# Patient Record
Sex: FEMALE | Race: BLACK OR AFRICAN AMERICAN | NOT HISPANIC OR LATINO | Employment: FULL TIME | URBAN - METROPOLITAN AREA
[De-identification: names, ages, dates, MRNs, and addresses within clinical notes are randomized per-mention and may not be internally consistent; named-entity substitution may affect disease eponyms.]

---

## 2017-03-10 DIAGNOSIS — F41.8 ANXIETY ASSOCIATED WITH DEPRESSION: ICD-10-CM

## 2017-03-10 DIAGNOSIS — K58.9 IRRITABLE BOWEL SYNDROME WITHOUT DIARRHEA: ICD-10-CM

## 2017-03-10 DIAGNOSIS — K21.9 GASTROESOPHAGEAL REFLUX DISEASE, ESOPHAGITIS PRESENCE NOT SPECIFIED: ICD-10-CM

## 2017-03-10 DIAGNOSIS — K52.9 GASTROENTERITIS: ICD-10-CM

## 2017-03-10 DIAGNOSIS — R51.9 HEADACHE DISORDER: ICD-10-CM

## 2017-03-10 RX ORDER — DIAZEPAM 10 MG/1
10 TABLET ORAL 2 TIMES DAILY PRN
Qty: 60 TABLET | Refills: 1 | OUTPATIENT
Start: 2017-03-10 | End: 2017-04-09

## 2017-03-10 RX ORDER — TOPIRAMATE 50 MG/1
50 TABLET, FILM COATED ORAL EVERY 12 HOURS
OUTPATIENT
Start: 2017-03-10

## 2017-03-10 RX ORDER — ELETRIPTAN HYDROBROMIDE 40 MG/1
40 TABLET, FILM COATED ORAL
Qty: 30 TABLET | Refills: 2 | OUTPATIENT
Start: 2017-03-10

## 2017-03-10 RX ORDER — ESOMEPRAZOLE MAGNESIUM 40 MG/1
40 CAPSULE, DELAYED RELEASE ORAL 2 TIMES DAILY
Qty: 60 CAPSULE | Refills: 3 | OUTPATIENT
Start: 2017-03-10

## 2017-03-10 RX ORDER — DICYCLOMINE HYDROCHLORIDE 20 MG/1
20 TABLET ORAL DAILY
Qty: 120 TABLET | Refills: 3 | OUTPATIENT
Start: 2017-03-10

## 2017-03-10 RX ORDER — PROMETHAZINE HYDROCHLORIDE 25 MG/1
25 TABLET ORAL EVERY 4 HOURS
Qty: 15 TABLET | Refills: 0 | OUTPATIENT
Start: 2017-03-10

## 2017-03-10 NOTE — TELEPHONE ENCOUNTER
----- Message from Carri Fall sent at 3/10/2017  1:04 PM CST -----  Contact: [phone]  [place] is leaving [place] of [place] country on [place] and she needs refills on all her medication pt is going to be gone for 6 mos Please call pt at your earliest convenience.  Thanks !

## 2017-03-11 ENCOUNTER — OFFICE VISIT (OUTPATIENT)
Dept: FAMILY MEDICINE | Facility: CLINIC | Age: 42
End: 2017-03-11
Payer: COMMERCIAL

## 2017-03-11 VITALS
SYSTOLIC BLOOD PRESSURE: 135 MMHG | DIASTOLIC BLOOD PRESSURE: 83 MMHG | TEMPERATURE: 98 F | RESPIRATION RATE: 17 BRPM | WEIGHT: 251.31 LBS | OXYGEN SATURATION: 97 % | HEIGHT: 67 IN | BODY MASS INDEX: 39.44 KG/M2 | HEART RATE: 82 BPM

## 2017-03-11 DIAGNOSIS — K58.9 IRRITABLE BOWEL SYNDROME WITHOUT DIARRHEA: ICD-10-CM

## 2017-03-11 DIAGNOSIS — K52.9 GASTROENTERITIS: ICD-10-CM

## 2017-03-11 DIAGNOSIS — K21.9 GASTROESOPHAGEAL REFLUX DISEASE, ESOPHAGITIS PRESENCE NOT SPECIFIED: ICD-10-CM

## 2017-03-11 PROCEDURE — 1160F RVW MEDS BY RX/DR IN RCRD: CPT | Mod: S$GLB,,, | Performed by: NURSE PRACTITIONER

## 2017-03-11 PROCEDURE — 99999 PR PBB SHADOW E&M-EST. PATIENT-LVL III: CPT | Mod: PBBFAC,,,

## 2017-03-11 PROCEDURE — 99212 OFFICE O/P EST SF 10 MIN: CPT | Mod: S$GLB,,, | Performed by: NURSE PRACTITIONER

## 2017-03-11 RX ORDER — PROMETHAZINE HYDROCHLORIDE 25 MG/1
25 TABLET ORAL EVERY 4 HOURS
Qty: 15 TABLET | Refills: 0 | Status: SHIPPED | OUTPATIENT
Start: 2017-03-11 | End: 2018-08-01

## 2017-03-11 RX ORDER — ESOMEPRAZOLE MAGNESIUM 40 MG/1
40 CAPSULE, DELAYED RELEASE ORAL 2 TIMES DAILY
Qty: 60 CAPSULE | Refills: 0 | Status: SHIPPED | OUTPATIENT
Start: 2017-03-11 | End: 2018-04-26 | Stop reason: SDUPTHER

## 2017-03-11 RX ORDER — TOPIRAMATE 50 MG/1
50 TABLET, FILM COATED ORAL EVERY 12 HOURS
Status: CANCELLED | OUTPATIENT
Start: 2017-03-11

## 2017-03-11 RX ORDER — DICYCLOMINE HYDROCHLORIDE 20 MG/1
20 TABLET ORAL DAILY
Qty: 120 TABLET | Refills: 0 | Status: SHIPPED | OUTPATIENT
Start: 2017-03-11 | End: 2018-09-17

## 2017-03-11 RX ORDER — DICYCLOMINE HYDROCHLORIDE 20 MG/1
20 TABLET ORAL DAILY
Qty: 120 TABLET | Refills: 3 | Status: CANCELLED | OUTPATIENT
Start: 2017-03-11

## 2017-03-11 RX ORDER — ESOMEPRAZOLE MAGNESIUM 40 MG/1
40 CAPSULE, DELAYED RELEASE ORAL 2 TIMES DAILY
Qty: 60 CAPSULE | Refills: 3 | Status: CANCELLED | OUTPATIENT
Start: 2017-03-11

## 2017-03-11 RX ORDER — TOPIRAMATE 50 MG/1
50 TABLET, FILM COATED ORAL EVERY 12 HOURS
Qty: 60 TABLET | Refills: 0 | Status: ON HOLD | OUTPATIENT
Start: 2017-03-11 | End: 2018-07-11 | Stop reason: HOSPADM

## 2017-03-11 NOTE — PROGRESS NOTES
Subjective:       Patient ID: Miley Smith is a 41 y.o. female who presents to urgent care for medication refill, says she called on Friday for Dr. 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       Chief Complaint: Medication Refill    HPI  Review of Systems   Constitutional: Negative for activity change, appetite change, chills and fatigue.   Respiratory: Negative for apnea, cough, choking, chest tightness, shortness of breath and stridor.    Cardiovascular: Negative for chest pain and leg swelling.   Gastrointestinal: Negative for abdominal distention, abdominal pain, anal bleeding, blood in stool, constipation, diarrhea, nausea, rectal pain and vomiting.   Genitourinary: Negative for dyspareunia, dysuria and enuresis.   Neurological: Positive for headaches. Negative for dizziness, facial asymmetry and light-headedness.   Psychiatric/Behavioral: Negative for agitation, behavioral problems, confusion and decreased concentration.       Objective:      Physical Exam   Constitutional: She is oriented to person, place, and time. She appears well-developed and well-nourished. No distress.   Neck: Normal range of motion. Neck supple.   Cardiovascular: Normal rate, regular rhythm and normal heart sounds.    Pulmonary/Chest: Effort normal and breath sounds normal. No respiratory distress.   Abdominal: Soft. Bowel sounds are normal. She exhibits no distension. There is no tenderness.   Musculoskeletal: Normal range of motion. She exhibits no tenderness.   Lymphadenopathy:     She has no cervical adenopathy.   Neurological: She is alert and oriented to person, place, and time.   Skin: Skin is warm and dry. No rash noted.   Psychiatric: She has a normal mood and affect. Her behavior is normal. Judgment and thought content normal.   Nursing note and vitals  reviewed.      Assessment:       1. Gastroesophageal reflux disease, esophagitis presence not specified    2. Irritable bowel syndrome without diarrhea    3. Gastroenteritis        Plan:       Explained that urgent care could only provide one month supply, says she really needs the Valium refilled but explained to her that Urgent care can not refill sedatives    Miley was seen today for medication refill.    Diagnoses and all orders for this visit:    Gastroesophageal reflux disease, esophagitis presence not specified  -     esomeprazole (NEXIUM) 40 MG capsule; Take 1 capsule (40 mg total) by mouth 2 (two) times daily.    Irritable bowel syndrome without diarrhea  -     dicyclomine (BENTYL) 20 mg tablet; Take 1 tablet (20 mg total) by mouth once daily.    Gastroenteritis  -     promethazine (PHENERGAN) 25 MG tablet; Take 1 tablet (25 mg total) by mouth every 4 (four) hours.    Other orders  -     Cancel: topiramate (TOPAMAX) 50 MG tablet; Take 1 tablet (50 mg total) by mouth every 12 (twelve) hours.  -     Cancel: esomeprazole (NEXIUM) 40 MG capsule; Take 1 capsule (40 mg total) by mouth 2 (two) times daily.  -     Cancel: dicyclomine (BENTYL) 20 mg tablet; Take 1 tablet (20 mg total) by mouth once daily.  -     topiramate (TOPAMAX) 50 MG tablet; Take 1 tablet (50 mg total) by mouth every 12 (twelve) hours. Every 12 hours        Education  Pt has been given instructions populated from [employer] database and those entered into patient instructions field and has verbalized understanding of the after visit summary and information contained wherein.    Follow Up  If no better 2-3 days fu with pcp    In Case of Emergency   May go to ER for acute shortness of breath, lightheadedness, fever, or any other emergent complaints or changes in condition.

## 2018-04-26 ENCOUNTER — OFFICE VISIT (OUTPATIENT)
Dept: URGENT CARE | Facility: CLINIC | Age: 43
End: 2018-04-26
Payer: COMMERCIAL

## 2018-04-26 VITALS
BODY MASS INDEX: 41.28 KG/M2 | TEMPERATURE: 98 F | HEIGHT: 67 IN | SYSTOLIC BLOOD PRESSURE: 132 MMHG | DIASTOLIC BLOOD PRESSURE: 74 MMHG | OXYGEN SATURATION: 98 % | RESPIRATION RATE: 12 BRPM | HEART RATE: 82 BPM | WEIGHT: 263 LBS

## 2018-04-26 DIAGNOSIS — M94.0 COSTOCHONDRITIS, ACUTE: ICD-10-CM

## 2018-04-26 DIAGNOSIS — J20.9 ACUTE PURULENT BRONCHITIS: Primary | ICD-10-CM

## 2018-04-26 DIAGNOSIS — K21.9 GASTROESOPHAGEAL REFLUX DISEASE, ESOPHAGITIS PRESENCE NOT SPECIFIED: ICD-10-CM

## 2018-04-26 PROCEDURE — 99214 OFFICE O/P EST MOD 30 MIN: CPT | Mod: S$GLB,,, | Performed by: NURSE PRACTITIONER

## 2018-04-26 RX ORDER — CODEINE PHOSPHATE AND GUAIFENESIN 10; 100 MG/5ML; MG/5ML
5 SOLUTION ORAL NIGHTLY PRN
Qty: 118 ML | Refills: 0 | Status: SHIPPED | OUTPATIENT
Start: 2018-04-26 | End: 2018-05-06

## 2018-04-26 RX ORDER — ESOMEPRAZOLE MAGNESIUM 40 MG/1
CAPSULE, DELAYED RELEASE ORAL
Qty: 180 CAPSULE | Refills: 0 | Status: SHIPPED | OUTPATIENT
Start: 2018-04-26 | End: 2019-04-01 | Stop reason: SDUPTHER

## 2018-04-26 RX ORDER — BENZONATATE 100 MG/1
200 CAPSULE ORAL 3 TIMES DAILY PRN
Qty: 30 CAPSULE | Refills: 0 | Status: SHIPPED | OUTPATIENT
Start: 2018-04-26 | End: 2018-05-06

## 2018-04-26 RX ORDER — ESOMEPRAZOLE MAGNESIUM 40 MG/1
40 CAPSULE, DELAYED RELEASE ORAL 2 TIMES DAILY
Qty: 60 CAPSULE | Refills: 0 | Status: SHIPPED | OUTPATIENT
Start: 2018-04-26 | End: 2018-04-26 | Stop reason: SDUPTHER

## 2018-04-26 RX ORDER — AZITHROMYCIN 250 MG/1
TABLET, FILM COATED ORAL
Qty: 2 TABLET | Refills: 0 | Status: SHIPPED | OUTPATIENT
Start: 2018-04-26 | End: 2018-05-02 | Stop reason: ALTCHOICE

## 2018-04-26 NOTE — PATIENT INSTRUCTIONS
Please follow up with your primary care provider if you are not feeling better in 7-10 days.    Please drink plenty of fluids.  Please get plenty of rest.    Please return here or go to the Emergency Department for any concerns or worsening of condition.    If you were prescribed antibiotics, please take them to completion.    If you were prescribed a narcotic medication (cough syrup), do not drive or operate heavy equipment or machinery while taking these medications.    If you do not have Hypertension or any history of palpitations, it is ok to take over the counter Sudafed or Mucinex D or Allegra-D or Claritin-D or Zyrtec-D.  If you do take one of the above, it is ok to combine that with plain over the counter Mucinex or Allegra or Claritin or Zyrtec.  If for example you are taking Zyrtec -D, you can combine that with Mucinex, but not Mucinex-D.  If you are taking Mucinex-D, you can combine that with plain Allegra or Claritin or Zyrtec.     If you do have Hypertension or palpitations, it is safe to take Coricidin HBP or Mucinex DM for relief of congestion and cough. Take as directed on bottle with at least 2 glasses of water.    If not allergic, please take over the counter Tylenol (Acetaminophen) and/or Motrin (Ibuprofen) as directed on bottle for control of pain and/or fever.    Please follow up with your primary care doctor or specialist as needed.    If you  smoke, please stop smoking.    Bronchitis, Antibiotic Treatment (Adult)    Bronchitis is an infection of the air passages (bronchial tubes) in your lungs. It often occurs when you have a cold. This illness is contagious during the first few days and is spread through the air by coughing and sneezing, or by direct contact (touching the sick person and then touching your own eyes, nose, or mouth).  Symptoms of bronchitis include cough with mucus (phlegm) and low-grade fever. Bronchitis usually lasts 7 to 14 days. Mild cases can be treated with simple home  remedies. More severe infection is treated with an antibiotic.  Home care  Follow these guidelines when caring for yourself at home:  · If your symptoms are severe, rest at home for the first 2 to 3 days. When you go back to your usual activities, don't let yourself get too tired.  · Do not smoke. Also avoid being exposed to secondhand smoke.  · You may use over-the-counter medicines to control fever or pain, unless another medicine was prescribed. (Note: If you have chronic liver or kidney disease or have ever had a stomach ulcer or gastrointestinal bleeding, talk with your healthcare provider before using these medicines. Also talk to your provider if you are taking medicine to prevent blood clots.) Aspirin should never be given to anyone younger than 18 years of age who is ill with a viral infection or fever. It may cause severe liver or brain damage.  · Your appetite may be poor, so a light diet is fine. Avoid dehydration by drinking 6 to 8 glasses of fluids per day (such as water, soft drinks, sports drinks, juices, tea, or soup). Extra fluids will help loosen secretions in the nose and lungs.  · Over-the-counter cough, cold, and sore-throat medicines will not shorten the length of the illness, but they may be helpful to reduce symptoms. (Note: Do not use decongestants if you have high blood pressure.)  · Finish all antibiotic medicine. Do this even if you are feeling better after only a few days.  Follow-up care  Follow up with your healthcare provider, or as advised. If you had an X-ray or ECG (electrocardiogram), a specialist will review it. You will be notified of any new findings that may affect your care.  Note: If you are age 65 or older, or if you have a chronic lung disease or condition that affects your immune system, or you smoke, talk to your healthcare provider about having pneumococcal vaccinations and a yearly influenza vaccination (flu shot).  When to seek medical advice  Call your healthcare  provider right away if any of these occur:  · Fever of 100.4°F (38°C) or higher  · Coughing up increased amounts of colored sputum  · Weakness, drowsiness, headache, facial pain, ear pain, or a stiff neck  Call 911, or get immediate medical care  Contact emergency services right away if any of these occur.  · Coughing up blood  · Worsening weakness, drowsiness, headache, or stiff neck  · Trouble breathing, wheezing, or pain with breathing  Date Last Reviewed: 9/13/2015 © 2000-2017 [employer]. [address] [place] [id number]. All rights reserved. This information is not intended as a substitute for professional medical care. Always follow your healthcare professional's instructions.

## 2018-04-26 NOTE — PROGRESS NOTES
"Subjective:       Patient ID: Miley Smith is a 42 y.o. female.    Vitals:  height is 5' 7" (1.702 m) and weight is 119.3 kg (263 lb). Her oral temperature is 97.9 °F (36.6 °C). Her blood pressure is 132/74 and her pulse is 82. Her respiration is 12 and oxygen saturation is 98%.     Chief Complaint: URI    Pt reports symptoms for greater than 1 week with lower front rib pain when taking deep breaths and twisting.      URI    This is a new problem. The current episode started 1 to 4 weeks ago. The problem has been gradually worsening. There has been no fever. Associated symptoms include chest pain (tightness), coughing, headaches, a sore throat and wheezing. Pertinent negatives include no abdominal pain, congestion, diarrhea, dysuria, ear pain, joint pain, joint swelling, nausea, neck pain, plugged ear sensation, rash, rhinorrhea, sinus pain, sneezing, swollen glands or vomiting. She has tried nothing for the symptoms.     Review of Systems   Constitution: Negative for chills, fever and malaise/fatigue.   HENT: Positive for hoarse voice and sore throat. Negative for congestion, ear pain, rhinorrhea, sinus pain and sneezing.    Eyes: Negative for discharge and redness.   Cardiovascular: Positive for chest pain (tightness). Negative for dyspnea on exertion and leg swelling.   Respiratory: Positive for cough, shortness of breath and wheezing. Negative for sputum production.    Skin: Negative for rash.   Musculoskeletal: Positive for back pain. Negative for joint pain, myalgias and neck pain.   Gastrointestinal: Negative for abdominal pain, diarrhea, nausea and vomiting.   Genitourinary: Negative for dysuria.   Neurological: Positive for headaches.   All other systems reviewed and are negative.      Objective:      Physical Exam   Constitutional: She is oriented to person, place, and time. Vital signs are normal. She appears well-developed and well-nourished. She is cooperative.  Non-toxic appearance. " She does not have a sickly appearance. She does not appear ill. No distress.   HENT:   Head: Normocephalic and atraumatic.   Right Ear: Hearing, tympanic membrane, external ear and ear canal normal.   Left Ear: Hearing, tympanic membrane, external ear and ear canal normal.   Nose: Mucosal edema and rhinorrhea present.   Mouth/Throat: Uvula is midline and mucous membranes are normal. Posterior oropharyngeal erythema present.   Eyes: Conjunctivae and lids are normal.   Neck: Normal range of motion and full passive range of motion without pain. Neck supple. No neck rigidity. No edema, no erythema and normal range of motion present.   Cardiovascular: Normal rate, regular rhythm and normal heart sounds.    Pulmonary/Chest: Effort normal and breath sounds normal. No accessory muscle usage. No apnea, no tachypnea and no bradypnea. No respiratory distress. She has no decreased breath sounds. She has no wheezes. She has no rhonchi. She has no rales.   Positive cough   Abdominal: Normal appearance.   Musculoskeletal:        Arms:  Pain in BL lower anterior ribs that is reproducible on palpation, deep breathing, and twisting of torso.   Lymphadenopathy:        Head (right side): No submental, no submandibular, no tonsillar, no preauricular, no posterior auricular and no occipital adenopathy present.        Head (left side): No submental, no submandibular, no tonsillar, no preauricular, no posterior auricular and no occipital adenopathy present.     She has no cervical adenopathy.   Neurological: She is alert and oriented to person, place, and time.   Skin: Skin is warm. Capillary refill takes less than 2 seconds.   Psychiatric: She has a normal mood and affect. Her speech is normal and behavior is normal.   Nursing note and vitals reviewed.      Assessment:       1. Acute purulent bronchitis    2. Gastroesophageal reflux disease, esophagitis presence not specified    3. Costochondritis, acute        Plan:         Acute  purulent bronchitis  -     azithromycin (ZITHROMAX) 250 MG tablet; Take 2 tablets (500 mg) on  Day 1,  followed by 1 tablet (250 mg) once daily on Days 2 through 5.  Dispense: 2 tablet; Refill: 0  -     benzonatate (TESSALON PERLES) 100 MG capsule; Take 2 capsules (200 mg total) by mouth 3 (three) times daily as needed.  Dispense: 30 capsule; Refill: 0  -     guaifenesin-codeine 100-10 mg/5 ml (TUSSI-ORGANIDIN NR)  mg/5 mL syrup; Take 5 mLs by mouth nightly as needed.  Dispense: 118 mL; Refill: 0    Gastroesophageal reflux disease, esophagitis presence not specified  -     esomeprazole (NEXIUM) 40 MG capsule; Take 1 capsule (40 mg total) by mouth 2 (two) times daily.  Dispense: 60 capsule; Refill: 0    Costochondritis, acute      NSAIDs and mucinex as directed on bottle. Increase fluid intake.

## 2018-05-02 ENCOUNTER — OFFICE VISIT (OUTPATIENT)
Dept: FAMILY MEDICINE | Facility: CLINIC | Age: 43
End: 2018-05-02
Payer: COMMERCIAL

## 2018-05-02 VITALS
WEIGHT: 268.31 LBS | HEIGHT: 67 IN | TEMPERATURE: 98 F | HEART RATE: 88 BPM | OXYGEN SATURATION: 97 % | SYSTOLIC BLOOD PRESSURE: 120 MMHG | DIASTOLIC BLOOD PRESSURE: 80 MMHG | BODY MASS INDEX: 42.11 KG/M2

## 2018-05-02 DIAGNOSIS — F41.8 ANXIETY ASSOCIATED WITH DEPRESSION: ICD-10-CM

## 2018-05-02 PROCEDURE — 99999 PR PBB SHADOW E&M-EST. PATIENT-LVL III: CPT | Mod: PBBFAC,,, | Performed by: FAMILY MEDICINE

## 2018-05-02 PROCEDURE — 99214 OFFICE O/P EST MOD 30 MIN: CPT | Mod: S$GLB,,, | Performed by: FAMILY MEDICINE

## 2018-05-02 RX ORDER — DIAZEPAM 10 MG/1
10 TABLET ORAL 2 TIMES DAILY PRN
Qty: 30 TABLET | Refills: 2 | Status: SHIPPED | OUTPATIENT
Start: 2018-05-02 | End: 2018-06-22 | Stop reason: SDUPTHER

## 2018-05-02 NOTE — PROGRESS NOTES
"Subjective:       Patient ID: Miley Smith is a 42 y.o. female.    Chief Complaint: Establish Care and Urgent Care Follow Up/ Bronchitis    Patient is here for refills of her valium. She states she takes this as needed for anxiety.    She was seen at urgent care for bronchitis. She was treated with a zpak and tessalon perles. She was given a cough syrup with codeine. It has improved. She denies fever or chills.       Review of Systems   Constitutional: Negative for chills, diaphoresis, fatigue and fever.   Respiratory: Positive for cough. Negative for shortness of breath and wheezing.    Cardiovascular: Negative for leg swelling.       Objective:       Vitals:    05/02/18 1127   BP: 120/80   Pulse: 88   Temp: 98 °F (36.7 °C)   TempSrc: Oral   SpO2: 97%   Weight: 121.7 kg (268 lb 4.8 oz)   Height: 5' 7" (1.702 m)   Body mass index is 42.02 kg/m².\      Physical Exam   Constitutional: She is oriented to person, place, and time. She appears well-developed and well-nourished. No distress.   HENT:   Head: Normocephalic and atraumatic.   Eyes: Conjunctivae are normal.   Neck: Normal range of motion. Neck supple. Carotid bruit is not present.   Cardiovascular: Normal rate, regular rhythm and normal heart sounds.  Exam reveals no gallop and no friction rub.    No murmur heard.  Pulmonary/Chest: Effort normal and breath sounds normal. No respiratory distress. She has no wheezes. She has no rales.   Musculoskeletal: She exhibits no edema.   Neurological: She is alert and oriented to person, place, and time.   Skin: She is not diaphoretic.       Assessment:       1. Anxiety associated with depression        Plan:       Miley was seen today for establish care and urgent care follow up/ bronchitis.    Diagnoses and all orders for this visit:    Anxiety associated with depression  -     diazePAM (VALIUM) 10 MG Tab; Take 1 tablet (10 mg total) by mouth 2 (two) times daily as needed.           "

## 2018-06-11 DIAGNOSIS — F41.8 ANXIETY ASSOCIATED WITH DEPRESSION: ICD-10-CM

## 2018-06-11 RX ORDER — DIAZEPAM 10 MG/1
10 TABLET ORAL 2 TIMES DAILY PRN
Qty: 30 TABLET | Refills: 2 | Status: CANCELLED | OUTPATIENT
Start: 2018-06-11 | End: 2018-07-11

## 2018-06-11 NOTE — TELEPHONE ENCOUNTER
----- Message from Yanely Wu sent at 6/11/2018  1:12 PM CDT -----  Contact: Self/ [phone]   Refill: [diazePAM (VALIUM) 10 MG Tab] Thank you.    [facility] [address] , [place] [address] [phone]

## 2018-06-11 NOTE — TELEPHONE ENCOUNTER
----- Message from Anne Hoffman sent at 6/11/2018  2:41 PM CDT -----  Contact: Self   Patient would like the doctor to see her medication to a different Pharmacy than in initial message. Please call patient at [phone].      [facility]

## 2018-06-15 ENCOUNTER — TELEPHONE (OUTPATIENT)
Dept: FAMILY MEDICINE | Facility: CLINIC | Age: 43
End: 2018-06-15

## 2018-06-15 NOTE — TELEPHONE ENCOUNTER
----- Message from Gisel Landers sent at 6/15/2018  3:57 PM CDT -----  Contact: Self   Pt is returning your call. Please call at [phone]. She says her script is telling her to take them twice a day so that's why she was taking them twice daily.

## 2018-06-22 ENCOUNTER — OFFICE VISIT (OUTPATIENT)
Dept: FAMILY MEDICINE | Facility: CLINIC | Age: 43
End: 2018-06-22
Payer: COMMERCIAL

## 2018-06-22 VITALS
TEMPERATURE: 98 F | HEIGHT: 67 IN | DIASTOLIC BLOOD PRESSURE: 80 MMHG | SYSTOLIC BLOOD PRESSURE: 100 MMHG | OXYGEN SATURATION: 97 % | HEART RATE: 84 BPM | WEIGHT: 272.25 LBS | BODY MASS INDEX: 42.73 KG/M2

## 2018-06-22 DIAGNOSIS — F41.8 ANXIETY ASSOCIATED WITH DEPRESSION: ICD-10-CM

## 2018-06-22 PROCEDURE — 99214 OFFICE O/P EST MOD 30 MIN: CPT | Mod: S$GLB,,, | Performed by: FAMILY MEDICINE

## 2018-06-22 PROCEDURE — 99999 PR PBB SHADOW E&M-EST. PATIENT-LVL III: CPT | Mod: PBBFAC,,, | Performed by: FAMILY MEDICINE

## 2018-06-22 PROCEDURE — 3008F BODY MASS INDEX DOCD: CPT | Mod: CPTII,S$GLB,, | Performed by: FAMILY MEDICINE

## 2018-06-22 RX ORDER — DIAZEPAM 10 MG/1
10 TABLET ORAL 2 TIMES DAILY PRN
Qty: 45 TABLET | Refills: 2 | Status: SHIPPED | OUTPATIENT
Start: 2018-06-22 | End: 2018-10-01

## 2018-06-22 RX ORDER — ESCITALOPRAM OXALATE 10 MG/1
10 TABLET ORAL DAILY
Qty: 30 TABLET | Refills: 11 | Status: SHIPPED | OUTPATIENT
Start: 2018-06-22 | End: 2018-09-17

## 2018-06-22 NOTE — PROGRESS NOTES
"Subjective:       Patient ID: Miley Smith is a 42 y.o. female.    Chief Complaint: Follow Up/ Renew Meds    Patient presents for refills of her valium. She states she just moved here and is getting acclimated to a new environment. She states she is taking this twice a day. 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 She states she has panic attacks and constant worrying. She states she journals so she doesn't forget things. She states this helps her rest at night. She denies depressive symptoms, feeling down or depression.     Past Medical History:  No date: IBS (irritable bowel syndrome)  No date: Migraines   History reviewed. No pertinent surgical history.  Review of patient's family history indicates:  Problem: Hypertension      Relation: Mother       Age of Onset: (Not Specified)   Problem: Graves' disease      Relation: Mother       Age of Onset: (Not Specified)     Social History    Marital status:              Spouse name:                       Years of education:                 Number of children:               Occupational History    None on file    Social History Main Topics    Smoking status: Never Smoker                                                                Smokeless tobacco: Never Used                        Alcohol use: Yes                Comment: social    Drug use: No              Sexual activity: Yes                  Other Topics            Concern    None on file    Social History Narrative    , .   Two children.  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            Review of Systems    Objective:       /  Vitals:    06/22/18 1129   BP: 100/80   Pulse: 84   Temp: 98.1 °F (36.7 °C)   TempSrc: Oral   SpO2: 97%   Weight: 123.5 kg (272 lb 4.3 oz)   Height: 5' 7" (1.702 m)       Physical Exam   Constitutional: She is oriented to person, place, and time. She appears well-developed and well-nourished. No distress.   Cardiovascular: Normal rate, regular rhythm and " normal heart sounds.  Exam reveals no gallop and no friction rub.    No murmur heard.  Pulmonary/Chest: Effort normal and breath sounds normal. No respiratory distress. She has no wheezes. She has no rales. She exhibits no tenderness.   Musculoskeletal: She exhibits no edema.   Neurological: She is alert and oriented to person, place, and time.   Skin: She is not diaphoretic.        Assessment:       1. Anxiety associated with depression        Plan:       Miley was seen today for follow up/ renew meds.    Diagnoses and all orders for this visit:    Anxiety associated with depression  -     diazePAM (VALIUM) 10 MG Tab; Take 1 tablet (10 mg total) by mouth 2 (two) times daily as needed.  -     escitalopram oxalate (LEXAPRO) 10 MG tablet; Take 1 tablet (10 mg total) by mouth once daily.  Start lexapro 10 mg daily and will wean off valium,     Other orders  -     Cancel: Lipid panel; Future    Follow-up in about 1 month (around 7/22/2018).

## 2018-07-09 DIAGNOSIS — Z12.39 BREAST CANCER SCREENING: ICD-10-CM

## 2018-07-11 ENCOUNTER — HOSPITAL ENCOUNTER (OUTPATIENT)
Facility: HOSPITAL | Age: 43
LOS: 1 days | Discharge: HOME | End: 2018-07-11
Attending: EMERGENCY MEDICINE | Admitting: HOSPITALIST
Payer: COMMERCIAL

## 2018-07-11 VITALS
HEART RATE: 70 BPM | OXYGEN SATURATION: 100 % | WEIGHT: 270 LBS | HEIGHT: 67 IN | DIASTOLIC BLOOD PRESSURE: 90 MMHG | RESPIRATION RATE: 18 BRPM | SYSTOLIC BLOOD PRESSURE: 133 MMHG | TEMPERATURE: 98 F | BODY MASS INDEX: 42.38 KG/M2

## 2018-07-11 DIAGNOSIS — I10 ESSENTIAL HYPERTENSION: Chronic | ICD-10-CM

## 2018-07-11 DIAGNOSIS — R07.9 CHEST PAIN: ICD-10-CM

## 2018-07-11 DIAGNOSIS — E78.5 HYPERLIPIDEMIA, UNSPECIFIED HYPERLIPIDEMIA TYPE: Chronic | ICD-10-CM

## 2018-07-11 DIAGNOSIS — I26.99 PULMONARY EMBOLUS: Primary | ICD-10-CM

## 2018-07-11 DIAGNOSIS — I26.99 OTHER ACUTE PULMONARY EMBOLISM WITHOUT ACUTE COR PULMONALE: ICD-10-CM

## 2018-07-11 DIAGNOSIS — I20.89 ANGINA AT REST: ICD-10-CM

## 2018-07-11 DIAGNOSIS — K21.9 GASTROESOPHAGEAL REFLUX DISEASE, ESOPHAGITIS PRESENCE NOT SPECIFIED: Chronic | ICD-10-CM

## 2018-07-11 DIAGNOSIS — E66.01 CLASS 3 SEVERE OBESITY DUE TO EXCESS CALORIES WITH SERIOUS COMORBIDITY AND BODY MASS INDEX (BMI) OF 40.0 TO 44.9 IN ADULT: Chronic | ICD-10-CM

## 2018-07-11 LAB
ALBUMIN SERPL-MCNC: 3.5 G/DL (ref 3.3–5.5)
ALLENS TEST: NORMAL
ALP SERPL-CCNC: 58 U/L (ref 42–141)
B-HCG UR QL: NEGATIVE
BILIRUB SERPL-MCNC: 0.7 MG/DL (ref 0.2–1.6)
BILIRUBIN, POC UA: NEGATIVE
BLOOD, POC UA: ABNORMAL
BUN SERPL-MCNC: 8 MG/DL (ref 7–22)
CALCIUM SERPL-MCNC: 9.3 MG/DL (ref 8–10.3)
CHLORIDE SERPL-SCNC: 98 MMOL/L (ref 98–108)
CLARITY, POC UA: CLEAR
COLOR, POC UA: YELLOW
CREAT SERPL-MCNC: 1 MG/DL (ref 0.6–1.2)
CTP QC/QA: YES
DELSYS: NORMAL
DIASTOLIC DYSFUNCTION: NO
ESTIMATED PA SYSTOLIC PRESSURE: 23.07
GLUCOSE SERPL-MCNC: 102 MG/DL (ref 73–118)
GLUCOSE, POC UA: NEGATIVE
HCO3 UR-SCNC: 24.4 MMOL/L (ref 24–28)
INR PPP: 1
KETONES, POC UA: NEGATIVE
LDH SERPL L TO P-CCNC: 0.59 MMOL/L (ref 0.36–1.25)
LEUKOCYTE EST, POC UA: NEGATIVE
MITRAL VALVE REGURGITATION: NORMAL
NITRITE, POC UA: NEGATIVE
PCO2 BLDA: 37.8 MMHG (ref 35–45)
PH SMN: 7.42 [PH] (ref 7.35–7.45)
PH UR STRIP: 7 [PH]
PO2 BLDA: 83 MMHG (ref 80–100)
POC ALT (SGPT): 18 U/L (ref 10–47)
POC AST (SGOT): 24 U/L (ref 11–38)
POC BE: 0 MMOL/L
POC CARDIAC TROPONIN I: 0 NG/ML
POC SATURATED O2: 96 % (ref 95–100)
POC TCO2: 26 MMOL/L (ref 23–27)
POC TCO2: 29 MMOL/L (ref 18–33)
POTASSIUM BLD-SCNC: 3.9 MMOL/L (ref 3.6–5.1)
PROTEIN, POC UA: NEGATIVE
PROTEIN, POC: 7.5 G/DL (ref 6.4–8.1)
PROTHROMBIN TIME: 10.4 SEC
RETIRED EF AND QEF - SEE NOTES: 60 (ref 55–65)
SAMPLE: NORMAL
SAMPLE: NORMAL
SITE: NORMAL
SODIUM BLD-SCNC: 137 MMOL/L (ref 128–145)
SPECIFIC GRAVITY, POC UA: 1.02
TRICUSPID VALVE REGURGITATION: NORMAL
TROPONIN I SERPL DL<=0.01 NG/ML-MCNC: <0.006 NG/ML
TSH SERPL DL<=0.005 MIU/L-ACNC: 1.24 UIU/ML
UROBILINOGEN, POC UA: 0.2 E.U./DL

## 2018-07-11 PROCEDURE — 36415 COLL VENOUS BLD VENIPUNCTURE: CPT

## 2018-07-11 PROCEDURE — 85610 PROTHROMBIN TIME: CPT

## 2018-07-11 PROCEDURE — 84484 ASSAY OF TROPONIN QUANT: CPT | Mod: 91

## 2018-07-11 PROCEDURE — 84484 ASSAY OF TROPONIN QUANT: CPT

## 2018-07-11 PROCEDURE — 99285 EMERGENCY DEPT VISIT HI MDM: CPT | Mod: 25

## 2018-07-11 PROCEDURE — 93306 TTE W/DOPPLER COMPLETE: CPT | Mod: 26,,, | Performed by: INTERNAL MEDICINE

## 2018-07-11 PROCEDURE — 82803 BLOOD GASES ANY COMBINATION: CPT

## 2018-07-11 PROCEDURE — G0378 HOSPITAL OBSERVATION PER HR: HCPCS

## 2018-07-11 PROCEDURE — 96372 THER/PROPH/DIAG INJ SC/IM: CPT | Mod: 59

## 2018-07-11 PROCEDURE — 83036 HEMOGLOBIN GLYCOSYLATED A1C: CPT

## 2018-07-11 PROCEDURE — 63600175 PHARM REV CODE 636 W HCPCS: Performed by: EMERGENCY MEDICINE

## 2018-07-11 PROCEDURE — 36600 WITHDRAWAL OF ARTERIAL BLOOD: CPT

## 2018-07-11 PROCEDURE — 25500020 PHARM REV CODE 255: Performed by: EMERGENCY MEDICINE

## 2018-07-11 PROCEDURE — 25000003 PHARM REV CODE 250: Performed by: EMERGENCY MEDICINE

## 2018-07-11 PROCEDURE — 93010 ELECTROCARDIOGRAM REPORT: CPT | Mod: ,,, | Performed by: INTERNAL MEDICINE

## 2018-07-11 PROCEDURE — 99900035 HC TECH TIME PER 15 MIN (STAT)

## 2018-07-11 PROCEDURE — 96360 HYDRATION IV INFUSION INIT: CPT

## 2018-07-11 PROCEDURE — 25000003 PHARM REV CODE 250: Performed by: HOSPITALIST

## 2018-07-11 PROCEDURE — 80053 COMPREHEN METABOLIC PANEL: CPT

## 2018-07-11 PROCEDURE — 84443 ASSAY THYROID STIM HORMONE: CPT

## 2018-07-11 PROCEDURE — 81025 URINE PREGNANCY TEST: CPT | Performed by: EMERGENCY MEDICINE

## 2018-07-11 PROCEDURE — 96361 HYDRATE IV INFUSION ADD-ON: CPT

## 2018-07-11 PROCEDURE — 81003 URINALYSIS AUTO W/O SCOPE: CPT

## 2018-07-11 PROCEDURE — 93005 ELECTROCARDIOGRAM TRACING: CPT

## 2018-07-11 PROCEDURE — 93306 TTE W/DOPPLER COMPLETE: CPT

## 2018-07-11 PROCEDURE — 85025 COMPLETE CBC W/AUTO DIFF WBC: CPT

## 2018-07-11 RX ORDER — ASPIRIN 81 MG/1
81 TABLET ORAL DAILY
Status: DISCONTINUED | OUTPATIENT
Start: 2018-07-12 | End: 2018-07-11 | Stop reason: HOSPADM

## 2018-07-11 RX ORDER — ASPIRIN 325 MG
325 TABLET ORAL
Status: COMPLETED | OUTPATIENT
Start: 2018-07-11 | End: 2018-07-11

## 2018-07-11 RX ORDER — LISINOPRIL 5 MG/1
10 TABLET ORAL DAILY
Status: DISCONTINUED | OUTPATIENT
Start: 2018-07-11 | End: 2018-07-11 | Stop reason: HOSPADM

## 2018-07-11 RX ORDER — ISOSORBIDE MONONITRATE 30 MG/1
30 TABLET, EXTENDED RELEASE ORAL DAILY
Status: DISCONTINUED | OUTPATIENT
Start: 2018-07-11 | End: 2018-07-11 | Stop reason: HOSPADM

## 2018-07-11 RX ORDER — ACETAMINOPHEN 500 MG
1000 TABLET ORAL EVERY 6 HOURS PRN
Status: DISCONTINUED | OUTPATIENT
Start: 2018-07-11 | End: 2018-07-11

## 2018-07-11 RX ORDER — PANTOPRAZOLE SODIUM 40 MG/1
40 TABLET, DELAYED RELEASE ORAL DAILY
Status: DISCONTINUED | OUTPATIENT
Start: 2018-07-11 | End: 2018-07-11 | Stop reason: HOSPADM

## 2018-07-11 RX ORDER — ENOXAPARIN SODIUM 100 MG/ML
1 INJECTION SUBCUTANEOUS
Status: COMPLETED | OUTPATIENT
Start: 2018-07-11 | End: 2018-07-11

## 2018-07-11 RX ORDER — RAMELTEON 8 MG/1
8 TABLET ORAL NIGHTLY PRN
Status: DISCONTINUED | OUTPATIENT
Start: 2018-07-11 | End: 2018-07-11 | Stop reason: HOSPADM

## 2018-07-11 RX ORDER — ASPIRIN 81 MG/1
81 TABLET ORAL DAILY
Refills: 0 | COMMUNITY
Start: 2018-07-12 | End: 2018-08-24

## 2018-07-11 RX ORDER — NITROGLYCERIN 0.4 MG/1
0.4 TABLET SUBLINGUAL EVERY 5 MIN PRN
Status: DISCONTINUED | OUTPATIENT
Start: 2018-07-11 | End: 2018-07-11 | Stop reason: HOSPADM

## 2018-07-11 RX ORDER — PRAVASTATIN SODIUM 40 MG/1
40 TABLET ORAL DAILY
Status: DISCONTINUED | OUTPATIENT
Start: 2018-07-11 | End: 2018-07-11 | Stop reason: HOSPADM

## 2018-07-11 RX ORDER — ONDANSETRON 8 MG/1
8 TABLET, ORALLY DISINTEGRATING ORAL EVERY 8 HOURS PRN
Status: DISCONTINUED | OUTPATIENT
Start: 2018-07-11 | End: 2018-07-11 | Stop reason: HOSPADM

## 2018-07-11 RX ORDER — DIAZEPAM 5 MG/1
5 TABLET ORAL 2 TIMES DAILY PRN
Status: DISCONTINUED | OUTPATIENT
Start: 2018-07-11 | End: 2018-07-11 | Stop reason: HOSPADM

## 2018-07-11 RX ORDER — PROMETHAZINE HYDROCHLORIDE 12.5 MG/1
12.5 SUPPOSITORY RECTAL EVERY 6 HOURS PRN
Status: DISCONTINUED | OUTPATIENT
Start: 2018-07-11 | End: 2018-07-11 | Stop reason: HOSPADM

## 2018-07-11 RX ORDER — ESCITALOPRAM OXALATE 10 MG/1
10 TABLET ORAL DAILY
Status: DISCONTINUED | OUTPATIENT
Start: 2018-07-11 | End: 2018-07-11 | Stop reason: HOSPADM

## 2018-07-11 RX ORDER — LISINOPRIL AND HYDROCHLOROTHIAZIDE 10; 12.5 MG/1; MG/1
1 TABLET ORAL DAILY
COMMUNITY

## 2018-07-11 RX ORDER — ENOXAPARIN SODIUM 150 MG/ML
1 INJECTION SUBCUTANEOUS EVERY 12 HOURS
Status: DISCONTINUED | OUTPATIENT
Start: 2018-07-11 | End: 2018-07-11

## 2018-07-11 RX ORDER — HYDROCHLOROTHIAZIDE 12.5 MG/1
12.5 TABLET ORAL DAILY
Status: DISCONTINUED | OUTPATIENT
Start: 2018-07-11 | End: 2018-07-11 | Stop reason: HOSPADM

## 2018-07-11 RX ORDER — PRAVASTATIN SODIUM 40 MG/1
40 TABLET ORAL DAILY
COMMUNITY

## 2018-07-11 RX ORDER — ACETAMINOPHEN 325 MG/1
650 TABLET ORAL EVERY 6 HOURS PRN
Status: DISCONTINUED | OUTPATIENT
Start: 2018-07-11 | End: 2018-07-11 | Stop reason: HOSPADM

## 2018-07-11 RX ORDER — ISOSORBIDE MONONITRATE 30 MG/1
30 TABLET, EXTENDED RELEASE ORAL DAILY
COMMUNITY

## 2018-07-11 RX ADMIN — ACETAMINOPHEN 1000 MG: 500 TABLET, FILM COATED ORAL at 08:07

## 2018-07-11 RX ADMIN — PANTOPRAZOLE SODIUM 40 MG: 40 TABLET, DELAYED RELEASE ORAL at 05:07

## 2018-07-11 RX ADMIN — IOHEXOL 100 ML: 350 INJECTION, SOLUTION INTRAVENOUS at 10:07

## 2018-07-11 RX ADMIN — ENOXAPARIN SODIUM 120 MG: 100 INJECTION SUBCUTANEOUS at 11:07

## 2018-07-11 RX ADMIN — SODIUM CHLORIDE 1000 ML: 0.9 INJECTION, SOLUTION INTRAVENOUS at 10:07

## 2018-07-11 RX ADMIN — ASPIRIN 325 MG ORAL TABLET 325 MG: 325 PILL ORAL at 08:07

## 2018-07-11 RX ADMIN — RIVAROXABAN 15 MG: 15 TABLET, FILM COATED ORAL at 05:07

## 2018-07-11 RX ADMIN — NITROGLYCERIN 0.4 MG: 0.4 TABLET SUBLINGUAL at 08:07

## 2018-07-11 RX ADMIN — ESCITALOPRAM OXALATE 10 MG: 10 TABLET ORAL at 05:07

## 2018-07-11 NOTE — ED NOTES
House Supervisor Mariusz at [facility] notified of pt's admit, states he will return call with bed assignment.

## 2018-07-11 NOTE — SUBJECTIVE & OBJECTIVE
Past Medical History:   Diagnosis Date    Hypertension     IBS (irritable bowel syndrome)     Migraines        History reviewed. No pertinent surgical history.    Review of patient's allergies indicates:  No Known Allergies    No current facility-administered medications on file prior to encounter.      Current Outpatient Prescriptions on File Prior to Encounter   Medication Sig    diazePAM (VALIUM) 10 MG Tab Take 1 tablet (10 mg total) by mouth 2 (two) times daily as needed.    dicyclomine (BENTYL) 20 mg tablet Take 1 tablet (20 mg total) by mouth once daily.    escitalopram oxalate (LEXAPRO) 10 MG tablet Take 1 tablet (10 mg total) by mouth once daily.    esomeprazole (NEXIUM) 40 MG capsule TAKE ONE CAPSULE BY MOUTH TWICE DAILY    promethazine (PHENERGAN) 25 MG tablet Take 1 tablet (25 mg total) by mouth every 4 (four) hours.    topiramate (TOPAMAX) 50 MG tablet Take 1 tablet (50 mg total) by mouth every 12 (twelve) hours. Every 12 hours    [DISCONTINUED] eletriptan (RELPAX) 40 MG tablet Take 1 tablet (40 mg total) by mouth as needed. 1 Tablet Oral Every 2 hours.  max 2 in 24 hours.     Family History     Problem Relation (Age of Onset)    Graves' disease Mother    Hypertension Mother        Social History Main Topics    Smoking status: Never Smoker    Smokeless tobacco: Never Used    Alcohol use Yes      Comment: social    Drug use: No    Sexual activity: Yes     Review of Systems   Constitutional: Negative for chills, fatigue and fever.   Eyes: Negative for photophobia and visual disturbance.   Respiratory: Positive for shortness of breath. Negative for cough.    Cardiovascular: Positive for chest pain. Negative for palpitations and leg swelling.   Gastrointestinal: Positive for nausea and vomiting. Negative for abdominal pain and diarrhea.   Genitourinary: Negative for dysuria, frequency and urgency.   Musculoskeletal: Positive for myalgias (calf pain).   Skin: Negative for pallor, rash and  wound.   Neurological: Positive for dizziness. Negative for syncope, light-headedness and headaches.   Psychiatric/Behavioral: Negative for confusion and decreased concentration.     Objective:     Vital Signs (Most Recent):  Temp: 98.2 °F (36.8 °C) (07/11/18 1358)  Pulse: 66 (07/11/18 1358)  Resp: 18 (07/11/18 1358)  BP: 136/89 (07/11/18 1358)  SpO2: 100 % (07/11/18 1358) Vital Signs (24h Range):  Temp:  [98.2 °F (36.8 °C)] 98.2 °F (36.8 °C)  Pulse:  [60-79] 66  Resp:  [16-18] 18  SpO2:  [93 %-100 %] 100 %  BP: ()/(54-89) 136/89     Weight: 122.5 kg (270 lb)  Body mass index is 42.29 kg/m².    Physical Exam   Constitutional: She is oriented to person, place, and time. She appears well-developed and well-nourished. No distress.   HENT:   Head: Normocephalic and atraumatic.   Right Ear: External ear normal.   Left Ear: External ear normal.   Nose: Nose normal.   Mouth/Throat: Oropharynx is clear and moist.   Eyes: Conjunctivae and EOM are normal. Pupils are equal, round, and reactive to light.   Neck: Normal range of motion. Neck supple.   Cardiovascular: Normal rate, regular rhythm and intact distal pulses.    Pulmonary/Chest: Effort normal and breath sounds normal. No respiratory distress. She has no wheezes.   Abdominal: Soft. Bowel sounds are normal. She exhibits no distension. There is no tenderness.   No palpable hepatomegaly or splenomegaly    Musculoskeletal: Normal range of motion. She exhibits no edema or tenderness.   Neurological: She is alert and oriented to person, place, and time.   Skin: Skin is warm and dry.   Psychiatric: She has a normal mood and affect. Thought content normal.   Nursing note and vitals reviewed.        CRANIAL NERVES     CN III, IV, VI   Pupils are equal, round, and reactive to light.  Extraocular motions are normal.        Significant Labs: All pertinent labs within the past 24 hours have been reviewed.    Significant Imaging: I have reviewed and interpreted all pertinent  imaging results/findings within the past 24 hours.

## 2018-07-11 NOTE — ED NOTES
Pt reports chest pain, under L breast that started on Monday, was being treated for cardiac problems and stopped taking medications because she felt better.  Denies nv or sob now but reports accompanied s/s this week w chest discomfort.  Reports hx of enlarged heart, possible coronary blockage.

## 2018-07-11 NOTE — HPI
42 y.o. female with HTN, HLD, angina, obsesity, GERD, and IBS presents with a complaint of chest pain.  Pain is left lateral chest, radiates to left arm and left scapula, acute onset 2 days ago, moderate to severe in intensity, associated with progressively worsening dizziness and SOB, relieved by SL NTG.  She states this is similar to prior chest pain episodes attributed to angina per her Cardiologist in [place] while she was at [facility] though she states she has never had a heart cath.  Reports abnormal stress test in March of this year and given option of cath or CT.  She opted for cardiac CT and is unsure of the results.  She has been non-adherent to her regimen of ASA, imdur, statin, ACEi, and thiazide.  She denies fever, chills, cough, palpitations, orthopnea, PND, lower ext edema, syncope, diarrhea, bloody or black stools, dysuria, frequency, or urgency.  Does endorse nausea and emesis x1 she states is typical of her GERD.  EKG shows diffuse flattened T waves no priors for comparison, initial troponin pending, chest xray unremarkable.  CTA chest was obtained and showed a single pulmonary embolus identified in distal portion of a segmental pulmonary artery in left upper lobe full dose enoxaparin initiated and placed in observation.

## 2018-07-11 NOTE — ED PROVIDER NOTES
Encounter Date: 7/11/2018       History   No chief complaint on file.    42-year-old female with past medical history of angina, acid reflex presents with chief complaint of chest pain started Monday at 4 pm.  Patient states pain has been getting worse.  This morning she got dizzy in the shower and felt very out of breath.  Patient states the pain is much worse each time she takes a deep breath.  Patient states she has had 2 episodes of vomiting 1 Monday night and 1 last night.  The vomiting did not make her feel better.  Patient states today when she got the shower she felt dizzy.  Patient states when she was in the shower her chest pain was 10/10.  She felt very dizzy and short of breath.  The pain is worse when she takes a deep breath. On arrival to the ER chest pain is 9/10.  Patient took Imdur for her chest pain last night, it did not help.      The history is provided by the patient.   Chest Pain   The current episode started several days ago. Duration of episode(s) is 2 days. Chest pain occurs constantly. Progression since onset: constant pain that severity goesd up and down. The pain is associated with breathing. At its most intense, the chest pain is at 10/10. The chest pain is currently at 9/10. The quality of the pain is described as sharp. Chest pain is worsened by deep breathing. Primary symptoms include shortness of breath, cough, vomiting and dizziness. Pertinent negatives for primary symptoms include no fever and no nausea.   The shortness of breath began 2 days ago. The patient's medical history does not include COPD or chronic lung disease.   The cough began more than 1 week ago. The cough is dry.   Dizziness also occurs with vomiting. Dizziness does not occur with nausea or weakness.     Pertinent negatives for associated symptoms include no numbness and no weakness.   Pertinent negatives for past medical history include no seizures.     Review of patient's allergies indicates:  No Known  Allergies  Past Medical History:   Diagnosis Date    IBS (irritable bowel syndrome)     Migraines      No past surgical history on file.  Family History   Problem Relation Age of Onset    Hypertension Mother     Graves' disease Mother      Social History   Substance Use Topics    Smoking status: Never Smoker    Smokeless tobacco: Never Used    Alcohol use Yes      Comment: social     Review of Systems   Constitutional: Negative for fever.   HENT: Negative for sore throat.    Respiratory: Positive for cough and shortness of breath.    Cardiovascular: Positive for chest pain.   Gastrointestinal: Positive for vomiting. Negative for nausea.   Genitourinary: Negative for dysuria.   Musculoskeletal: Negative for back pain.   Skin: Negative for rash.   Neurological: Positive for dizziness. Negative for tremors, seizures, speech difficulty, weakness, numbness and headaches.   Hematological: Does not bruise/bleed easily.   All other systems reviewed and are negative.      Physical Exam     Initial Vitals   BP Pulse Resp Temp SpO2   07/11/18 0809 07/11/18 0809 07/11/18 0809 07/11/18 0813 07/11/18 0809   110/76 79 16 98.2 °F (36.8 °C) 96 %      MAP       --                Physical Exam    Nursing note and vitals reviewed.  Constitutional: She appears well-developed and well-nourished.   HENT:   Head: Normocephalic and atraumatic.   Right Ear: External ear normal.   Left Ear: External ear normal.   Eyes: Conjunctivae and EOM are normal. Pupils are equal, round, and reactive to light. Right eye exhibits no discharge. Left eye exhibits no discharge.   Neck: Normal range of motion. Neck supple. JVD present.   Cardiovascular: Normal rate, regular rhythm, normal heart sounds and intact distal pulses. Exam reveals no gallop and no friction rub.    No murmur heard.  Pulmonary/Chest: Breath sounds normal. No respiratory distress. She has no wheezes. She has no rhonchi. She has no rales. She exhibits no tenderness.   Abdominal:  Soft. Bowel sounds are normal. She exhibits no distension and no mass. There is no tenderness. There is no rebound and no guarding.   Musculoskeletal: Normal range of motion. She exhibits no edema or tenderness.   Lymphadenopathy:     She has no cervical adenopathy.   Neurological: She is alert and oriented to person, place, and time. She has normal strength. No cranial nerve deficit or sensory deficit.   Skin: Skin is warm and dry. No rash noted. No pallor.   Psychiatric: She has a normal mood and affect.         ED Course   Critical Care  Date/Time: 7/11/2018 11:21 AM  Performed by: LAURA GOLDMAN  Authorized by: LAURA GOLDMAN   Direct patient critical care time: 11 minutes  Additional history critical care time: 10 minutes  Ordering / reviewing critical care time: 10 minutes  Documentation critical care time: 10 minutes  Consulting other physicians critical care time: 10 minutes  Total critical care time (exclusive of procedural time) : 51 minutes  Critical care was necessary to treat or prevent imminent or life-threatening deterioration of the following conditions: cardiac failure, circulatory failure and respiratory failure.  Critical care was time spent personally by me on the following activities: discussions with consultants, examination of patient, obtaining history from patient or surrogate, evaluation of patient's response to treatment, ordering and performing treatments and interventions, ordering and review of laboratory studies, ordering and review of radiographic studies, pulse oximetry, re-evaluation of patient's condition and review of old charts.        Labs Reviewed   POCT URINALYSIS W/O SCOPE - Abnormal; Notable for the following:        Result Value    Glucose, UA Negative (*)     Bilirubin, UA Negative (*)     Ketones, UA Negative (*)     Blood, UA Trace-lysed (*)     Protein, UA Negative (*)     Nitrite, UA Negative (*)     Leukocytes, UA Negative (*)     All other components within normal limits    POCT CMP - Abnormal; Notable for the following:     POC Chloride 98 (*)     All other components within normal limits   PROTIME-INR   TROPONIN ISTAT   POCT URINALYSIS W/O SCOPE   POCT URINE PREGNANCY   POCT CBC   POCT CMP   POCT PROTIME-INR   POCT TROPONIN   ISTAT PROCEDURE     EKG Readings: (Independently Interpreted)   Initial Reading: No STEMI. Rhythm: Normal Sinus Rhythm. Heart Rate: 74. Axis: Normal. Clinical Impression: Normal Sinus Rhythm Other Impression: Normal EKG, ventricular rate of 71, normal axis, QTC normal at 436   Other EKG Interpretations: Repeat EKG at 9:23 a.m.  Normal sinus rhythm ventricular rate of 66, left axis when compared to prior EKG repeat EKG has nonspecific T-wave abnormality, QTC is 434, rate has decreased by 8 beats per minute       Imaging Results          CTA Chest Non-Coronary (PE Study) (Final result)     Abnormal  Result time 07/11/18 10:55:47    Final result by Reynold Mitchell MD (07/11/18 10:55:47)                 Impression:      1. Single pulmonary embolus identified in distal portion of a segmental pulmonary artery in left upper lobe.  2. No other acute cardiopulmonary findings identified.  3. Hiatal hernia and other findings as above.  4.  This report was flagged in Epic as abnormal.    COMMUNICATION  This critical result was discovered/received at 10:45 a.m..  The critical information above was relayed directly by me by telephone to Dr. Sidhu on 07/11/2018 at 10:53 a.m..      Electronically signed by: Reynold Mitchell MD  Date:    07/11/2018  Time:    10:55             Narrative:    EXAMINATION:  CTA CHEST NON CORONARY    CLINICAL HISTORY:  Chest pain, acute, nonspecific, low prob CAD;    TECHNIQUE:  Low dose axial images, sagittal and coronal reformations were obtained from the thoracic inlet to the lung bases following the IV administration of 100 mL of Omnipaque 350.  Contrast timing was optimized to evaluate the pulmonary arteries.  Multiplanar  reconstructions including MIP images were post processed for vessel analysis.    COMPARISON:  Chest 7/11/18    FINDINGS:  The main, right and left pulmonary arteries are enhanced with contrast.  The segmental pulmonary artery to the apical posterior segment of the left upper lobe shows a filling defect consistent with pulmonary thromboembolism (coronal image 109 and sagittal image 122).  This is the only PE detected.    Visualized structures in the lower neck and both axillary areas are unremarkable.  Mediastinal and hilar areas show no abnormal masses or significant lymph node enlargement.  Lower mediastinum has several cm of the stomach indicating a hiatal hernia.  Thoracic aorta is normal size with left-sided arch.  Heart size is normal without pericardial fluid.    Visualized upper abdomen shows possible hepatic steatosis.    Trachea and bronchi are patent with good expansion of both lungs.  Lungs are clear without consolidation or pleural fluid.    Skeletal structures are intact without acute finding.                               X-Ray Chest PA And Lateral (Final result)  Result time 07/11/18 08:48:19    Final result by eRynold Mitchell MD (07/11/18 08:48:19)                 Impression:      No acute cardiopulmonary disease      Electronically signed by: Reynold Mitchell MD  Date:    07/11/2018  Time:    08:48             Narrative:    EXAMINATION:  XR CHEST PA AND LATERAL    CLINICAL HISTORY:  Chest Pain;    TECHNIQUE:  PA and lateral views of the chest were performed.    COMPARISON:  01/25/2011    FINDINGS:  The heart size and mediastinal contour are normal.  Hiatal hernia may be present.  Lungs are expanded and clear.  No lung consolidation or pleural fluid is identified.  The skeletal structures are intact.                                                  Medical decision making   Chief complaint:  Chest pain shortness of breath and dizziness  Differential diagnosis:  Treatment in the ED Physical  Exam, aspirin and nitroglycerin  Patient reports total resolution of symptoms after medication.  Chest pain went from 9/10 to 2/10 after nitroglycerin given  Discussed labs, and imaging results with the patient.    Heart score of 4 with chest pain, discussed with Cardiology Dr. Lovelace if PE study is negative will send patient follow up outpatient in his clinic.  CT positive for PE will admit due to chest pain, shortness of breath, and dizziness with exertion.    Patient is agreeable to transfer & admission at [facility].    Spoke with Melissa at utilization Management patient is an inpatient admit.  Requesting consultation with Internal Medicine for admission.   Discussed patient's presentation, past medical history, physical exam, and ED course.  Consultation with DR Dr. Freire for admission.  Also discussed vital signs and diagnosis of pulmonary embolus, chest pain, dizziness with standing.  At this time patient will be transferred & admitted.      At time of transfer patient is awake alert oriented x4 speaking clearly in full sentences and moving all 4 extremities.       Clinical Impression:   The primary encounter diagnosis was Angina at rest. Diagnoses of Chest pain, Other acute pulmonary embolism without acute cor pulmonale, and Pulmonary embolus were also pertinent to this visit.                             Cassidy Sidhu DO  07/11/18 0822

## 2018-07-11 NOTE — PROGRESS NOTES
[facility]    WRITTEN HEALTHCARE AND DISCHARGE INFORMATION    Follow-up Information     Alicia Bright MD On 7/18/2018.    Specialty:  Family Medicine  Why:  @8:30am, for Hospital Follow up  Contact information:  [address] [address]  [phone]                       Help at Home           [phone]  After discharge for assistance [facility] On Call Nurse Care Line 24/7  Assistance     Things You are responsible For To Manage Your Care At Home:  1.    Getting your prescriptions filled   2.    Taking your medications as directed, DO NOT MISS ANY DOSES!  3.    Going to your follow-up doctor appointment. This is important because it  allow the doctor to monitor your progress and determine if  any changes need to made to your treatment plan.     Thank you for choosing [facility] for your care.  Please answer any calls you may receive from [facility] we want to continue to support you as you manage your healthcare needs. [facility] is happy to have the opportunity to serve you.      Sincerely,  Your [facility] Team,  BRI Grey, ACM-RN; [facility]  [phone]

## 2018-07-11 NOTE — ASSESSMENT & PLAN NOTE
BMI Body mass index is 42.29 kg/m².  Patient counseled on risks obesity imparts on overall health. Urged toward diet and lifestyle modifications to aid in weight reduction.

## 2018-07-11 NOTE — ASSESSMENT & PLAN NOTE
Definitive diagnosis is unclear but her Cardiologist recently started her on ASA, imdur, and statin after abnormal stress test in March at which time she declined a cath, EKG with nonspecific T wave abnormality, no priors for comparison, and pain was relieved by nitro, HEART score 5.  -cardiac monitoring  -serial troponins  -ASA and PRN NTG  -2D echo  -TSH and lipid panel

## 2018-07-11 NOTE — ASSESSMENT & PLAN NOTE
Single pulmonary embolus identified in distal portion of a segmental pulmonary artery in left upper lobe. PESI Class 1, very low risk, obtain lower ext US to r/o DVT, continue anticoagulation

## 2018-07-11 NOTE — ED NOTES
After 1st nitro SL pt reports reduced pain in chest, BP has begin to come down as well.  MD notified, pain reduced. /56

## 2018-07-11 NOTE — H&P
"[facility] Hospital Medicine  History & Physical    Patient Name: Miley Smith  MRN: [id number]  Admission Date: 7/11/2018  Attending Physician: Lalo Wray MD   Primary Care Provider: Alicia Bright MD         Patient information was obtained from patient, past medical records and ER records.     Subjective:     Principal Problem:Pulmonary embolus    Chief Complaint:   Chief Complaint   Patient presents with    Chest Pain     Pain under left breast that radiates to left arm and left side posterior shoulder blade onset x2 days ago. Admits to being noncompliant to BP and cholesterol medication because, "I don't like the way it makes me feel.        HPI: 42 y.o. female with HTN, HLD, angina, obsesity, GERD, and IBS presents with a complaint of chest pain.  Pain is left lateral chest, radiates to left arm and left scapula, acute onset 2 days ago, moderate to severe in intensity, associated with progressively worsening dizziness and SOB, relieved by SL NTG.  She states this is similar to prior chest pain episodes attributed to angina per her Cardiologist in [place] while she was at [facility] though she states she has never had a heart cath.  Reports abnormal stress test in March of this year and given option of cath or CT.  She opted for cardiac CT and is unsure of the results.  She has been non-adherent to her regimen of ASA, imdur, statin, ACEi, and thiazide.  She denies fever, chills, cough, palpitations, orthopnea, PND, lower ext edema, syncope, diarrhea, bloody or black stools, dysuria, frequency, or urgency.  Does endorse nausea and emesis x1 she states is typical of her GERD.  EKG shows diffuse flattened T waves no priors for comparison, initial troponin pending, chest xray unremarkable.  CTA chest was obtained and showed a single pulmonary embolus identified in distal portion of a segmental pulmonary artery in left upper lobe full dose enoxaparin initiated and " placed in observation.    Past Medical History:   Diagnosis Date    Hypertension     IBS (irritable bowel syndrome)     Migraines        History reviewed. No pertinent surgical history.    Review of patient's allergies indicates:  No Known Allergies    No current facility-administered medications on file prior to encounter.      Current Outpatient Prescriptions on File Prior to Encounter   Medication Sig    diazePAM (VALIUM) 10 MG Tab Take 1 tablet (10 mg total) by mouth 2 (two) times daily as needed.    dicyclomine (BENTYL) 20 mg tablet Take 1 tablet (20 mg total) by mouth once daily.    escitalopram oxalate (LEXAPRO) 10 MG tablet Take 1 tablet (10 mg total) by mouth once daily.    esomeprazole (NEXIUM) 40 MG capsule TAKE ONE CAPSULE BY MOUTH TWICE DAILY    promethazine (PHENERGAN) 25 MG tablet Take 1 tablet (25 mg total) by mouth every 4 (four) hours.    topiramate (TOPAMAX) 50 MG tablet Take 1 tablet (50 mg total) by mouth every 12 (twelve) hours. Every 12 hours    [DISCONTINUED] eletriptan (RELPAX) 40 MG tablet Take 1 tablet (40 mg total) by mouth as needed. 1 Tablet Oral Every 2 hours.  max 2 in 24 hours.     Family History     Problem Relation (Age of Onset)    Graves' disease Mother    Hypertension Mother        Social History Main Topics    Smoking status: Never Smoker    Smokeless tobacco: Never Used    Alcohol use Yes      Comment: social    Drug use: No    Sexual activity: Yes     Review of Systems   Constitutional: Negative for chills, fatigue and fever.   Eyes: Negative for photophobia and visual disturbance.   Respiratory: Positive for shortness of breath. Negative for cough.    Cardiovascular: Positive for chest pain. Negative for palpitations and leg swelling.   Gastrointestinal: Positive for nausea and vomiting. Negative for abdominal pain and diarrhea.   Genitourinary: Negative for dysuria, frequency and urgency.   Musculoskeletal: Positive for myalgias (calf pain).   Skin: Negative  for pallor, rash and wound.   Neurological: Positive for dizziness. Negative for syncope, light-headedness and headaches.   Psychiatric/Behavioral: Negative for confusion and decreased concentration.     Objective:     Vital Signs (Most Recent):  Temp: 98.2 °F (36.8 °C) (07/11/18 1358)  Pulse: 66 (07/11/18 1358)  Resp: 18 (07/11/18 1358)  BP: 136/89 (07/11/18 1358)  SpO2: 100 % (07/11/18 1358) Vital Signs (24h Range):  Temp:  [98.2 °F (36.8 °C)] 98.2 °F (36.8 °C)  Pulse:  [60-79] 66  Resp:  [16-18] 18  SpO2:  [93 %-100 %] 100 %  BP: ()/(54-89) 136/89     Weight: 122.5 kg (270 lb)  Body mass index is 42.29 kg/m².    Physical Exam   Constitutional: She is oriented to person, place, and time. She appears well-developed and well-nourished. No distress.   HENT:   Head: Normocephalic and atraumatic.   Right Ear: External ear normal.   Left Ear: External ear normal.   Nose: Nose normal.   Mouth/Throat: Oropharynx is clear and moist.   Eyes: Conjunctivae and EOM are normal. Pupils are equal, round, and reactive to light.   Neck: Normal range of motion. Neck supple.   Cardiovascular: Normal rate, regular rhythm and intact distal pulses.    Pulmonary/Chest: Effort normal and breath sounds normal. No respiratory distress. She has no wheezes.   Abdominal: Soft. Bowel sounds are normal. She exhibits no distension. There is no tenderness.   No palpable hepatomegaly or splenomegaly    Musculoskeletal: Normal range of motion. She exhibits no edema or tenderness.   Neurological: She is alert and oriented to person, place, and time.   Skin: Skin is warm and dry.   Psychiatric: She has a normal mood and affect. Thought content normal.   Nursing note and vitals reviewed.        CRANIAL NERVES     CN III, IV, VI   Pupils are equal, round, and reactive to light.  Extraocular motions are normal.        Significant Labs: All pertinent labs within the past 24 hours have been reviewed.    Significant Imaging: I have reviewed and  interpreted all pertinent imaging results/findings within the past 24 hours.    Assessment/Plan:     * Pulmonary embolus    Single pulmonary embolus identified in distal portion of a segmental pulmonary artery in left upper lobe. PESI Class 1, very low risk, obtain lower ext US to r/o DVT, continue anticoagulation        Angina at rest    Definitive diagnosis is unclear but her Cardiologist recently started her on ASA, imdur, and statin after abnormal stress test in March at which time she declined a cath, EKG with nonspecific T wave abnormality, no priors for comparison, and pain was relieved by nitro, HEART score 5.  -cardiac monitoring  -serial troponins  -ASA and PRN NTG  -2D echo  -TSH and lipid panel        Class 3 severe obesity due to excess calories with serious comorbidity and body mass index (BMI) of 40.0 to 44.9 in adult    BMI Body mass index is 42.29 kg/m².  Patient counseled on risks obesity imparts on overall health. Urged toward diet and lifestyle modifications to aid in weight reduction.         Hyperlipidemia    Obtain lipid panel, continue statin        Essential hypertension    Well controlled, continue home medications and monitor blood pressure, adjust as needed.        GERD (gastroesophageal reflux disease)    Continue PPI          VTE Risk Mitigation         Ordered     enoxaparin injection 120 mg  Every 12 hours      07/11/18 1335     IP VTE HIGH RISK PATIENT  Once      07/11/18 1335        Luís Eaton Jr., APRN, AGACNP-BC  Hospitalist - Department of Hospital Medicine  [facility] [address] [place] [address]  Office #: [phone]; Pager #: [phone]

## 2018-07-12 LAB
ESTIMATED AVG GLUCOSE: 114 MG/DL
HBA1C MFR BLD HPLC: 5.6 %

## 2018-07-12 NOTE — NURSING
Pt discharged. PIV removed catheter intact and tele monitor discontinued. Discharge orders reviewed with pt and pt verbalized understanding. Discharge instructions were placed in blue folder and given to patient.  Pt's belongings removed from room and given to pt. NAD. Pt assisted off unit via wheelchair by transport.

## 2018-07-12 NOTE — DISCHARGE SUMMARY
[facility] Hospital Medicine  Discharge Summary      Patient Name: Miley Smith  MRN: [id number]  Admission Date: 7/11/2018  Hospital Length of Stay: 1 days  Discharge Date and Time: 7/11/2018  9:31 PM  Attending Physician: Lalo Wray MD  Discharging Provider: Luís Eaton Jr NP  Primary Care Provider: Alicia Bright MD      HPI:   42 y.o. female with HTN, HLD, angina, obsesity, GERD, and IBS presents with a complaint of chest pain.  Pain is left lateral chest, radiates to left arm and left scapula, acute onset 2 days ago, moderate to severe in intensity, associated with progressively worsening dizziness and SOB, relieved by SL NTG.  She states this is similar to prior chest pain episodes attributed to angina per her Cardiologist in [place] while she was at [facility] though she states she has never had a heart cath.  Reports abnormal stress test in March of this year and given option of cath or CT.  She opted for cardiac CT and is unsure of the results.  She has been non-adherent to her regimen of ASA, imdur, statin, ACEi, and thiazide.  She denies fever, chills, cough, palpitations, orthopnea, PND, lower ext edema, syncope, diarrhea, bloody or black stools, dysuria, frequency, or urgency.  Does endorse nausea and emesis x1 she states is typical of her GERD.  EKG shows diffuse flattened T waves no priors for comparison, initial troponin pending, chest xray unremarkable.  CTA chest was obtained and showed a single pulmonary embolus identified in distal portion of a segmental pulmonary artery in left upper lobe full dose enoxaparin initiated and placed in observation.    * No surgery found *      Hospital Course:   Ms. Smith was placed in observation for further evaluation and treatment of PE after presenting with chest pain.  EKG shows diffuse flattened T waves no priors for comparison, troponin negative x2, chest xray unremarkable.  CTA chest was obtained  and showed a single pulmonary embolus identified in distal portion of a segmental pulmonary artery in left upper lobe.  Bilateral lower extremity US negative for DVT.  Due to her unclear history of possible CAD, 2D echo was obtained and shows preserved LV function.  Stable, PESI class I, very low risk, no foreseeable barrier to obtaining and taking DOAC.  Risks and benefits of anticoagulation options discussed with her and she elects for rivaroxaban.  Prescription provided.  Discharged home in stable condition to follow up with her PCP.  No obvious provoking incident, hypercoagulable work up as outpatient.  Also instructed to establish with a local Cardiologist.     Consults:     No new Assessment & Plan notes have been filed under this hospital service since the last note was generated.  Service: Hospital Medicine    Final Active Diagnoses:    Diagnosis Date Noted POA    PRINCIPAL PROBLEM:  Pulmonary embolus [I26.99] 07/11/2018 Yes    Angina at rest [I20.8] 07/11/2018 Yes     Chronic    Essential hypertension [I10] 07/11/2018 Yes     Chronic    Hyperlipidemia [E78.5] 07/11/2018 Yes     Chronic    Class 3 severe obesity due to excess calories with serious comorbidity and body mass index (BMI) of 40.0 to 44.9 in adult [E66.01, Z68.41] 07/11/2018 Not Applicable     Chronic    GERD (gastroesophageal reflux disease) [K21.9] 11/04/2013 Yes     Chronic      Problems Resolved During this Admission:    Diagnosis Date Noted Date Resolved POA       Discharged Condition: stable    Disposition: Home or Self Care    Follow Up:  Follow-up Information     Alicia Bright MD On 7/18/2018.    Specialty:  Family Medicine  Why:  @8:30am, for Hospital Follow up  Contact information:  [address] [address]  [phone]                 Patient Instructions:     Diet Cardiac     Activity as tolerated         Significant Diagnostic Studies: Labs:   CMP No results for input(s): NA, K, CL, CO2, GLU, BUN,  CREATININE, CALCIUM, PROT, ALBUMIN, BILITOT, ALKPHOS, AST, ALT, ANIONGAP, ESTGFRAFRICA, EGFRNONAA in the last 48 hours., CBC No results for input(s): WBC, HGB, HCT, PLT in the last 48 hours. and Troponin   Recent Labs  Lab 07/11/18  1950   TROPONINI <0.006  <0.006     Radiology: CT scan:   EXAMINATION:  CTA CHEST NON CORONARY    CLINICAL HISTORY:  Chest pain, acute, nonspecific, low prob CAD;    TECHNIQUE:  Low dose axial images, sagittal and coronal reformations were obtained from the thoracic inlet to the lung bases following the IV administration of 100 mL of Omnipaque 350.  Contrast timing was optimized to evaluate the pulmonary arteries.  Multiplanar reconstructions including MIP images were post processed for vessel analysis.    COMPARISON:  Chest 7/11/18    FINDINGS:  The main, right and left pulmonary arteries are enhanced with contrast.  The segmental pulmonary artery to the apical posterior segment of the left upper lobe shows a filling defect consistent with pulmonary thromboembolism (coronal image 109 and sagittal image 122).  This is the only PE detected.    Visualized structures in the lower neck and both axillary areas are unremarkable.  Mediastinal and hilar areas show no abnormal masses or significant lymph node enlargement.  Lower mediastinum has several cm of the stomach indicating a hiatal hernia.  Thoracic aorta is normal size with left-sided arch.  Heart size is normal without pericardial fluid.    Visualized upper abdomen shows possible hepatic steatosis.    Trachea and bronchi are patent with good expansion of both lungs.  Lungs are clear without consolidation or pleural fluid.    Skeletal structures are intact without acute finding.   Impression       1. Single pulmonary embolus identified in distal portion of a segmental pulmonary artery in left upper lobe.  2. No other acute cardiopulmonary findings identified.  3. Hiatal hernia and other findings as above.  4.  This report was flagged in  Epic as abnormal.    COMMUNICATION  This critical result was discovered/received at 10:45 a.m..  The critical information above was relayed directly by me by telephone to Dr. Sidhu on 07/11/2018 at 10:53 a.m..      Electronically signed by: Reynold Mitchell MD  Date: 07/11/2018  Time: 10:55     Ultrasound:   Impression       No evidence of deep venous thrombosis in either lower extremity.     Cardiac Graphics: Echocardiogram:   2D echo with color flow doppler:   Results for orders placed or performed during the hospital encounter of 07/11/18   2D echo with color flow doppler   Result Value Ref Range    EF 60 55 - 65    Mitral Valve Regurgitation TRIVIAL     Diastolic Dysfunction No     Est. PA Systolic Pressure 23.07     Tricuspid Valve Regurgitation TRIVIAL        Pending Diagnostic Studies:     Procedure Component Value Units Date/Time    EKG 12-lead [[id number]]     Order Status:  Sent Lab Status:  No result     Hemoglobin A1c [[id number]] Collected:  07/11/18 1950    Order Status:  Sent Lab Status:  In process Updated:  07/11/18 1950    Specimen:  Blood from Blood          Medications:  Reconciled Home Medications:      Medication List      START taking these medications    aspirin 81 MG EC tablet  Commonly known as:  ECOTRIN  Take 1 tablet (81 mg total) by mouth once daily.  Start taking on:  7/12/2018     * rivaroxaban 15 mg Tab  Commonly known as:  XARELTO  Take 1 tablet (15 mg total) by mouth 2 (two) times daily with meals. for 21 days     * rivaroxaban 20 mg Tab  Commonly known as:  XARELTO  Take 1 tablet (20 mg total) by mouth daily with dinner or evening meal.        * This list has 2 medication(s) that are the same as other medications prescribed for you. Read the directions carefully, and ask your doctor or other care provider to review them with you.            CONTINUE taking these medications    diazePAM 10 MG Tab  Commonly known as:  VALIUM  Take 1 tablet (10 mg total) by mouth 2 (two) times daily  as needed.     dicyclomine 20 mg tablet  Commonly known as:  BENTYL  Take 1 tablet (20 mg total) by mouth once daily.     escitalopram oxalate 10 MG tablet  Commonly known as:  LEXAPRO  Take 1 tablet (10 mg total) by mouth once daily.     esomeprazole 40 MG capsule  Commonly known as:  NEXIUM  TAKE ONE CAPSULE BY MOUTH TWICE DAILY     isosorbide mononitrate 30 MG 24 hr tablet  Commonly known as:  IMDUR  Take 30 mg by mouth once daily.     lisinopril-hydrochlorothiazide 10-12.5 mg per tablet  Commonly known as:  PRINZIDE,ZESTORETIC  Take 1 tablet by mouth once daily.     pravastatin 40 MG tablet  Commonly known as:  PRAVACHOL  Take 40 mg by mouth once daily.     promethazine 25 MG tablet  Commonly known as:  PHENERGAN  Take 1 tablet (25 mg total) by mouth every 4 (four) hours.        STOP taking these medications    topiramate 50 MG tablet  Commonly known as:  TOPAMAX            Indwelling Lines/Drains at time of discharge:   Lines/Drains/Airways          No matching active lines, drains, or airways          Time spent on the discharge of patient: less than 30 minutes  Patient was seen and examined on the date of discharge and determined to be suitable for discharge.         Luís Eaton Jr, NP  Department of Hospital Medicine  [facility]

## 2018-07-12 NOTE — HOSPITAL COURSE
Ms. Smith was placed in observation for further evaluation and treatment of PE after presenting with chest pain.  EKG shows diffuse flattened T waves no priors for comparison, troponin negative x2, chest xray unremarkable.  CTA chest was obtained and showed a single pulmonary embolus identified in distal portion of a segmental pulmonary artery in left upper lobe.  Bilateral lower extremity US negative for DVT.  Due to her unclear history of possible CAD, 2D echo was obtained and shows preserved LV function.  Stable, PESI class I, very low risk, no foreseeable barrier to obtaining and taking DOAC.  Risks and benefits of anticoagulation options discussed with her and she elects for rivaroxaban.  Prescription provided.  Discharged home in stable condition to follow up with her PCP.  No obvious provoking incident, hypercoagulable work up as outpatient.  Also instructed to establish with a local Cardiologist.

## 2018-07-17 ENCOUNTER — LAB VISIT (OUTPATIENT)
Dept: LAB | Facility: HOSPITAL | Age: 43
End: 2018-07-17
Attending: FAMILY MEDICINE
Payer: COMMERCIAL

## 2018-07-17 ENCOUNTER — OFFICE VISIT (OUTPATIENT)
Dept: FAMILY MEDICINE | Facility: CLINIC | Age: 43
End: 2018-07-17
Payer: COMMERCIAL

## 2018-07-17 VITALS
TEMPERATURE: 99 F | BODY MASS INDEX: 41.87 KG/M2 | DIASTOLIC BLOOD PRESSURE: 90 MMHG | HEART RATE: 87 BPM | SYSTOLIC BLOOD PRESSURE: 130 MMHG | OXYGEN SATURATION: 98 % | WEIGHT: 266.75 LBS | HEIGHT: 67 IN

## 2018-07-17 DIAGNOSIS — I10 ESSENTIAL HYPERTENSION: ICD-10-CM

## 2018-07-17 DIAGNOSIS — R06.83 SNORING: ICD-10-CM

## 2018-07-17 DIAGNOSIS — I26.99 OTHER ACUTE PULMONARY EMBOLISM WITHOUT ACUTE COR PULMONALE: Primary | ICD-10-CM

## 2018-07-17 DIAGNOSIS — R53.83 FATIGUE, UNSPECIFIED TYPE: ICD-10-CM

## 2018-07-17 LAB
CHOLEST SERPL-MCNC: 197 MG/DL
CHOLEST/HDLC SERPL: 3.9 {RATIO}
HDLC SERPL-MCNC: 51 MG/DL
HDLC SERPL: 25.9 %
LDLC SERPL CALC-MCNC: 116.6 MG/DL
NONHDLC SERPL-MCNC: 146 MG/DL
TRIGL SERPL-MCNC: 147 MG/DL

## 2018-07-17 PROCEDURE — 99999 PR PBB SHADOW E&M-EST. PATIENT-LVL III: CPT | Mod: PBBFAC,,, | Performed by: FAMILY MEDICINE

## 2018-07-17 PROCEDURE — 80061 LIPID PANEL: CPT

## 2018-07-17 PROCEDURE — 3008F BODY MASS INDEX DOCD: CPT | Mod: CPTII,S$GLB,, | Performed by: FAMILY MEDICINE

## 2018-07-17 PROCEDURE — 36415 COLL VENOUS BLD VENIPUNCTURE: CPT | Mod: PO

## 2018-07-17 PROCEDURE — 99214 OFFICE O/P EST MOD 30 MIN: CPT | Mod: S$GLB,,, | Performed by: FAMILY MEDICINE

## 2018-07-17 NOTE — PROGRESS NOTES
Subjective:       Patient ID: Miley Smith is a 42 y.o. female.    Chief Complaint: Hospital Follow Up    Patient is her for hospital discharge.   Hospital Course:   Ms. Smith was placed in observation for further evaluation and treatment of PE after presenting with chest pain.  EKG shows diffuse flattened T waves no priors for comparison, troponin negative x2, chest xray unremarkable.  CTA chest was obtained and showed a single pulmonary embolus identified in distal portion of a segmental pulmonary artery in left upper lobe.  Bilateral lower extremity US negative for DVT.  Due to her unclear history of possible CAD, 2D echo was obtained and shows preserved LV function.  Stable, PESI class I, very low risk, no foreseeable barrier to obtaining and taking DOAC.  Risks and benefits of anticoagulation options discussed with her and she elects for rivaroxaban.  Prescription provided.  Discharged home in stable condition to follow up with her PCP.  No obvious provoking incident, hypercoagulable work up as outpatient.  Also instructed to establish with a local Cardiologist.      Consults:      No new Assessment & Plan notes have been filed under this hospital service since the last note was generated.  Service: Hospital Medicine    [place] has a blood clot in her lungs. She states she travels a lot . 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  She drove to [place] about 5 hours, [place]. She also had a 2 hour flight to [employer] [facility] [place]. She drove 4 hours to [place], [place] prior to this. She had a 14 hour flight from [facility] [place] [employer] in June 2017. This was the last time that she flew.     Her  is concerned as she snores and it seems that she takes short breaths and she has pauses in breathing.       Review of Systems    Objective:       Vitals:    07/17/18 1120   BP: (!) 130/90   Pulse: 87   Temp: 99 °F (37.2 °C)   TempSrc: Oral   SpO2: 98%   Weight: 121 kg (266 lb 12.1 oz)  "  Height: 5' 7" (1.702 m)       Physical Exam   Constitutional: She is oriented to person, place, and time. She appears well-developed and well-nourished. No distress.   HENT:   Head: Normocephalic and atraumatic.   Eyes: Conjunctivae are normal.   Neck: Normal range of motion. Neck supple. Carotid bruit is not present.   Cardiovascular: Normal rate, regular rhythm and normal heart sounds.  Exam reveals no gallop and no friction rub.    No murmur heard.  Pulmonary/Chest: Effort normal and breath sounds normal. No respiratory distress. She has no wheezes. She has no rales.   Musculoskeletal: She exhibits no edema.   Neurological: She is alert and oriented to person, place, and time.   Skin: She is not diaphoretic.       Assessment:       1. Other acute pulmonary embolism without acute cor pulmonale    2. Snoring    3. Fatigue, unspecified type    4. Essential hypertension        Plan:       Miley was seen today for hospital follow up.    Diagnoses and all orders for this visit:    Other acute pulmonary embolism without acute cor pulmonale  -     Cardiolipin antibody; Future  -     DRVVT; Future  -     Antithrombin III; Future  -     Factor 10 assay; Future  -     Fibrinogen; Future  -     Factor 5 leiden; Future  -     Factor 5 assay; Future  -     Protein C activity; Future  -     Protein S activity; Future  -     Prothrombin gene mutation; Future  -     Von Willebrand antigen; Future  -     Homocysteine, serum; Future  SHE WILL TAKE THIS AFTER 9 MONTHS OF XARELTO.    Snoring  -     CPAP titration (Must have diagnosis of CHARLES from previous sleep study.); Future    Fatigue, unspecified type  -     CPAP titration (Must have diagnosis of CHARLES from previous sleep study.); Future    Essential hypertension  -     Lipid panel; Future  DUE FOR CHOLESTEROL   Other orders  -     Cancel: Lipid panel; Future           "

## 2018-07-23 ENCOUNTER — TELEPHONE (OUTPATIENT)
Dept: FAMILY MEDICINE | Facility: CLINIC | Age: 43
End: 2018-07-23

## 2018-07-23 DIAGNOSIS — N92.0 MENORRHAGIA WITH REGULAR CYCLE: Primary | ICD-10-CM

## 2018-07-23 DIAGNOSIS — Z79.01 ANTICOAGULATED: ICD-10-CM

## 2018-07-23 NOTE — TELEPHONE ENCOUNTER
Patient called to find out if there is something she can take for her heavy menstrual cycle.  Stated she thinks it is related to the Xalrelto that she is taking.  Stated she is going through super absorbency tampons within 1 hour and 45 minutes to 2 hours (18 in two days).  Stated she is also using overnight pads as backup and she is soaking those too.  Stated she has not stopped taking the Xalrelto, but would like to know if is it is possible to stop taking it until her cycle ends. Please advise.      Please contact patient at [phone] with recommendations.     Patient informed that [facility] paperwork is ready for pickup at the clinic.

## 2018-07-23 NOTE — TELEPHONE ENCOUNTER
Called to get more information regarding heavy bleeding and Xalrelto.  Patient answered call, but then connection was lost.  Attempted to call patient several times but line was busy.      Patient also needs to be informed that [facility] paperwork is ready for pickup, per last message.

## 2018-07-23 NOTE — TELEPHONE ENCOUNTER
----- Message from Gregorio Rodriguez sent at 7/23/2018  8:32 AM CDT -----  Contact: Self/[phone]  Patient stated that she's bleeding really heavy. The patient is asking is it something she can do to stop the heavy bleeding. She also stated she believe she's bleeding heavy due to the rivaroxaban (XARELTO) 15 mg Tab. The patient is requesting someone contact her regarding this matter.    Thank you

## 2018-07-23 NOTE — TELEPHONE ENCOUNTER
NO SHE CANNOT STOP THIS. UNFORTUNATELY THIS IS A SIDE EFFECT OF HER MEDICATION. SHE MAY HAVE TO SEE AN OB-GYN TO DO AN ABLATION ON HER ENDOMETRIUM.

## 2018-07-26 ENCOUNTER — TELEPHONE (OUTPATIENT)
Dept: FAMILY MEDICINE | Facility: CLINIC | Age: 43
End: 2018-07-26

## 2018-07-26 NOTE — TELEPHONE ENCOUNTER
----- Message from Gregorio Rodriguez sent at 7/26/2018  3:36 PM CDT -----  Contact: Self/[phone]  Patient would like to know if Dr. Landis can put on her [facility] paperwork that she can work from home. Patient would like to drop the paper work off.    Thank you

## 2018-07-31 ENCOUNTER — TELEPHONE (OUTPATIENT)
Dept: FAMILY MEDICINE | Facility: CLINIC | Age: 43
End: 2018-07-31

## 2018-07-31 NOTE — TELEPHONE ENCOUNTER
----- Message from Isabel Jernigan sent at 7/31/2018  1:54 PM CDT -----  Contact: Claudine - employer   Car calling to speak to a nurse regarding pt health. [phone].

## 2018-08-01 ENCOUNTER — HOSPITAL ENCOUNTER (EMERGENCY)
Facility: HOSPITAL | Age: 43
Discharge: HOME | End: 2018-08-01
Attending: EMERGENCY MEDICINE
Payer: COMMERCIAL

## 2018-08-01 VITALS
TEMPERATURE: 98 F | RESPIRATION RATE: 20 BRPM | OXYGEN SATURATION: 99 % | DIASTOLIC BLOOD PRESSURE: 58 MMHG | HEART RATE: 74 BPM | SYSTOLIC BLOOD PRESSURE: 110 MMHG | BODY MASS INDEX: 41.12 KG/M2 | WEIGHT: 262 LBS | HEIGHT: 67 IN

## 2018-08-01 DIAGNOSIS — R07.9 CHEST PAIN: ICD-10-CM

## 2018-08-01 DIAGNOSIS — Z86.711 HISTORY OF PULMONARY EMBOLISM: ICD-10-CM

## 2018-08-01 DIAGNOSIS — R09.1 PLEURISY: Primary | ICD-10-CM

## 2018-08-01 LAB
ALBUMIN SERPL BCP-MCNC: 3.3 G/DL
ALP SERPL-CCNC: 55 U/L
ALT SERPL W/O P-5'-P-CCNC: 13 U/L
ANION GAP SERPL CALC-SCNC: 11 MMOL/L
AST SERPL-CCNC: 12 U/L
BASOPHILS # BLD AUTO: 0.01 K/UL
BASOPHILS NFR BLD: 0.2 %
BILIRUB SERPL-MCNC: 0.5 MG/DL
BNP SERPL-MCNC: 12 PG/ML
BUN SERPL-MCNC: 14 MG/DL
CALCIUM SERPL-MCNC: 9.7 MG/DL
CHLORIDE SERPL-SCNC: 107 MMOL/L
CO2 SERPL-SCNC: 23 MMOL/L
CREAT SERPL-MCNC: 0.9 MG/DL
DIFFERENTIAL METHOD: ABNORMAL
EOSINOPHIL # BLD AUTO: 0.1 K/UL
EOSINOPHIL NFR BLD: 2.2 %
ERYTHROCYTE [DISTWIDTH] IN BLOOD BY AUTOMATED COUNT: 13.2 %
EST. GFR  (AFRICAN AMERICAN): >60 ML/MIN/1.73 M^2
EST. GFR  (NON AFRICAN AMERICAN): >60 ML/MIN/1.73 M^2
GLUCOSE SERPL-MCNC: 96 MG/DL
HCT VFR BLD AUTO: 31.5 %
HGB BLD-MCNC: 10.4 G/DL
INR PPP: 1
LYMPHOCYTES # BLD AUTO: 2.4 K/UL
LYMPHOCYTES NFR BLD: 38.5 %
MCH RBC QN AUTO: 26.5 PG
MCHC RBC AUTO-ENTMCNC: 33 G/DL
MCV RBC AUTO: 80 FL
MONOCYTES # BLD AUTO: 0.4 K/UL
MONOCYTES NFR BLD: 7 %
NEUTROPHILS # BLD AUTO: 3.3 K/UL
NEUTROPHILS NFR BLD: 51.9 %
PLATELET # BLD AUTO: 391 K/UL
PMV BLD AUTO: 8.9 FL
POTASSIUM SERPL-SCNC: 3.7 MMOL/L
PROT SERPL-MCNC: 7.1 G/DL
PROTHROMBIN TIME: 10.4 SEC
RBC # BLD AUTO: 3.92 M/UL
SODIUM SERPL-SCNC: 141 MMOL/L
TROPONIN I SERPL DL<=0.01 NG/ML-MCNC: 0.01 NG/ML
WBC # BLD AUTO: 6.29 K/UL

## 2018-08-01 PROCEDURE — 93010 ELECTROCARDIOGRAM REPORT: CPT | Mod: ,,, | Performed by: INTERNAL MEDICINE

## 2018-08-01 PROCEDURE — 83880 ASSAY OF NATRIURETIC PEPTIDE: CPT

## 2018-08-01 PROCEDURE — 99284 EMERGENCY DEPT VISIT MOD MDM: CPT | Mod: 25

## 2018-08-01 PROCEDURE — 85610 PROTHROMBIN TIME: CPT

## 2018-08-01 PROCEDURE — 85025 COMPLETE CBC W/AUTO DIFF WBC: CPT

## 2018-08-01 PROCEDURE — 80053 COMPREHEN METABOLIC PANEL: CPT

## 2018-08-01 PROCEDURE — 93005 ELECTROCARDIOGRAM TRACING: CPT

## 2018-08-01 PROCEDURE — 84484 ASSAY OF TROPONIN QUANT: CPT

## 2018-08-01 RX ORDER — HYDROCODONE BITARTRATE AND HOMATROPINE METHYLBROMIDE ORAL SOLUTION 5; 1.5 MG/5ML; MG/5ML
5 LIQUID ORAL EVERY 4 HOURS PRN
Qty: 90 ML | Refills: 0 | Status: SHIPPED | OUTPATIENT
Start: 2018-08-01 | End: 2018-09-17 | Stop reason: ALTCHOICE

## 2018-08-01 NOTE — ED PROVIDER NOTES
"Encounter Date: 8/1/2018    This is a SORT/MSE of a 42 y.o. female presenting to the ED with c/o chest pain and pain in the back near the left shoulder. On Xalreto. Reports pain not similar to usual anginal pain. Feels similar to pain when she had the PE. Care will be transferred to an alternate provider when patient is roomed for a full evaluation and final disposition. BILL Ruffin, FNP-C 8/1/2018 5:14 PM    SCRIBE #1 NOTE: I, Aminata Alvarez, am scribing for, and in the presence of,  Amanuel De La Paz MD. I have scribed the following portions of the note - Other sections scribed: HPI, ROS, PE.       History     Chief Complaint   Patient presents with    Shoulder Pain     reports pain to left side of chest and left shoulder started Monday; reports in July 11 had PE, on xerolto; "feels exactly like the pain I had when I had the PE but now on left side instead of right. now, sharp shooting pain when I cough"    Chest Pain     CC: Shoulder Pain ; Chest Pain    43 y/o female with HTN, angina, and PE presents to the ED for emergent evaluation of L posterior shoulder pain, chest pain, SOB, cough, and chills that started x2 days ago. The pain is severe (9/10) and worse with deep inhalation and coughing. The patient states "I feel congested but I'm not coughing anything up." She reports a "sharp/shooting" pain to her L temple after coughing. The patient was admitted to this facility on 7/11/18, where she was dx with a PE to her L upper lobe. She presented to her PCP, Dr. Alicia Bright, on 7/17/18, where she had a lipid and cholesterol test and lab work done. She was told she would take Xarelto for the next 9 months. The patient was advised that her PE was probably due to constantly traveling back and forth from here to [place] recently. The patient is currently compliant with 20 mg Xarelto twice daily. The patient denies smoking cigarettes. She denies taking oral contraceptives. The patient denies fever, N/V/D, or " rash. No other symptoms reported.      The history is provided by the patient. No  was used.     Review of patient's allergies indicates:  No Known Allergies  Past Medical History:   Diagnosis Date    Hypertension     IBS (irritable bowel syndrome)     Migraines     Pulmonary embolism      History reviewed. No pertinent surgical history.  Family History   Problem Relation Age of Onset    Hypertension Mother     Graves' disease Mother     Deep vein thrombosis Mother     Heart disease Maternal Grandmother     Hypertension Maternal Grandmother     Deep vein thrombosis Maternal Grandmother      Social History   Substance Use Topics    Smoking status: Never Smoker    Smokeless tobacco: Never Used    Alcohol use Yes      Comment: social     Review of Systems   Constitutional: Positive for chills. Negative for diaphoresis, fatigue and fever.   HENT: Negative for congestion, ear pain, nosebleeds, rhinorrhea, sinus pain, sore throat and voice change.    Eyes: Negative for pain and redness.   Respiratory: Positive for cough and shortness of breath. Negative for wheezing.    Cardiovascular: Positive for chest pain. Negative for leg swelling.   Gastrointestinal: Negative for abdominal distention, abdominal pain, blood in stool, diarrhea, nausea and vomiting.   Genitourinary: Negative for decreased urine volume, difficulty urinating, dysuria, flank pain, frequency, hematuria and urgency.   Musculoskeletal: Negative for back pain, joint swelling, neck pain and neck stiffness.        (+) L posterior shoulder pain   Skin: Negative for rash and wound.   Neurological: Negative for syncope, weakness and headaches.   Psychiatric/Behavioral: Negative for confusion. The patient is not nervous/anxious.        Physical Exam     Initial Vitals [08/01/18 1716]   BP Pulse Resp Temp SpO2   121/70 88 20 98.2 °F (36.8 °C) 99 %      MAP       --         Physical Exam    Nursing note and vitals  reviewed.  Constitutional: She appears well-developed and well-nourished. She is not diaphoretic. No distress.   HENT:   Head: Normocephalic and atraumatic.   Eyes: Conjunctivae and EOM are normal. Pupils are equal, round, and reactive to light.   Neck: Normal range of motion. Neck supple.   Cardiovascular: Normal rate and regular rhythm.   Pulmonary/Chest: Breath sounds normal. No stridor. No respiratory distress. She has no wheezes.   Abdominal: Soft. Bowel sounds are normal. She exhibits no distension. There is no tenderness. There is no rebound and no guarding.   Musculoskeletal: Normal range of motion. She exhibits no edema or tenderness.   Neurological: She is alert and oriented to person, place, and time. She has normal strength.   Skin: Skin is warm and dry.   Psychiatric: She has a normal mood and affect. Her behavior is normal.         ED Course   Procedures  Labs Reviewed   CBC W/ AUTO DIFFERENTIAL - Abnormal; Notable for the following:        Result Value    RBC 3.92 (*)     Hemoglobin 10.4 (*)     Hematocrit 31.5 (*)     MCV 80 (*)     MCH 26.5 (*)     Platelets 391 (*)     MPV 8.9 (*)     All other components within normal limits   COMPREHENSIVE METABOLIC PANEL - Abnormal; Notable for the following:     Albumin 3.3 (*)     All other components within normal limits   TROPONIN I   B-TYPE NATRIURETIC PEPTIDE   PROTIME-INR     EKG Readings: (Independently Interpreted)   Initial Reading: No STEMI. Rhythm: Normal Sinus Rhythm. Heart Rate: 86.   No acute changes.       Imaging Results          X-Ray Chest AP Portable (Final result)  Result time 08/01/18 18:08:42    Final result by Darius Wilson MD (08/01/18 18:08:42)                 Impression:      No acute cardiopulmonary process identified.      Electronically signed by: Darius Wilson MD  Date:    08/01/2018  Time:    18:08             Narrative:    EXAMINATION:  XR CHEST AP PORTABLE    CLINICAL HISTORY:  Chest Pain;    TECHNIQUE:  Single frontal view  of the chest was performed.    COMPARISON:  07/11/2018.    FINDINGS:  Cardiac silhouette is normal in size.  Lungs are symmetrically expanded.  No evidence of focal consolidative process, pneumothorax, or significant effusion.  No acute osseous abnormality identified.                                 Medical Decision Making:   History:   Old Medical Records: I decided to obtain old medical records.  Clinical Tests:   Lab Tests: Ordered and Reviewed  Radiological Study: Ordered and Reviewed  Medical Tests: Ordered and Reviewed  ED Management:  The patient has a thromboembolism to her L upper lobe  3 weeks ago, now with cough and left upper shoulder/back pain that is consistent with her pleurisy.   No acute finding on labs chest x-ray and EKG.  Previous echo during last visit showed normal EF.  Patient is not currently short of breath normal blood pressures and O2 saturation on room air.  Will treat symptomatically with cough and pain meds.  Patient is stable for discharge            Scribe Attestation:   Scribe #1: I performed the above scribed service and the documentation accurately describes the services I performed. I attest to the accuracy of the note.    Attending Attestation:           Physician Attestation for Scribe:  Physician Attestation Statement for Scribe #1: I, Amanuel De La Paz MD, reviewed documentation, as scribed by Aminata Alvarez in my presence, and it is both accurate and complete.                    Clinical Impression:   The primary encounter diagnosis was Pleurisy. Diagnoses of Chest pain and History of pulmonary embolism were also pertinent to this visit.      Disposition:   Disposition: Discharged                        Amanuel Hernandez MD  08/01/18 1949

## 2018-08-01 NOTE — ED TRIAGE NOTES
Pt arrived to ED with chest pain and should pain to the left side of chest and left shoulder that began Monday. Pt was diagnosed with a PE on July 11th. Pt also states experiencing a sharp cough and had a mucous like dark substance from her nose earlier today. Pt says chest pain and shoulder pain feels like the pain she had when she was diagnosed with a PE but her pain right now is on her right side during this visit

## 2018-08-02 NOTE — DISCHARGE INSTRUCTIONS
Follow up with PCP in 2-3 days for further evaluation of your chest pain/pressure.  Seek medical care immediately if you experience chest pain with trouble breathing, fast heartbeat, feeling dizzy or not had similar chest pain before.    Return to ED if symptoms worsens or if you have any concerns

## 2018-08-07 ENCOUNTER — TELEPHONE (OUTPATIENT)
Dept: FAMILY MEDICINE | Facility: CLINIC | Age: 43
End: 2018-08-07

## 2018-08-07 NOTE — TELEPHONE ENCOUNTER
----- Message from Rosa Isela Carlson sent at 8/6/2018  3:45 PM CDT -----  Contact: Kortney Krueger [phone]/  [facility] [place]   Calling To speak with nurse regarding Pt needs validations in writing

## 2018-08-20 NOTE — TELEPHONE ENCOUNTER
Spoke with Kortney Krueger of [employer] [place] and she states that she need the restrictions(clarified) as related to Miley Belcher in writing, the working guidelines and the travel restrictions as well. She can be reached at [phone]

## 2018-08-22 ENCOUNTER — TELEPHONE (OUTPATIENT)
Dept: FAMILY MEDICINE | Facility: CLINIC | Age: 43
End: 2018-08-22

## 2018-08-22 NOTE — TELEPHONE ENCOUNTER
----- Message from Anne Hoffman sent at 8/22/2018  8:44 AM CDT -----  Contact: Self   Patient states her supervisor is requesting that she has detailed and specific orders of what she can and cannot do in writing. Patient states she has a work trip coming up which they told her she would not be able to take because of her restrictions. She says the trip is from 4th -10 th of December  One flight is 1hr 5 minutes and the second fight is 1hr 33minutes. And there is a lay over in between the two flights . Please call patient at [phone]

## 2018-08-24 ENCOUNTER — OFFICE VISIT (OUTPATIENT)
Dept: FAMILY MEDICINE | Facility: CLINIC | Age: 43
End: 2018-08-24
Payer: COMMERCIAL

## 2018-08-24 VITALS
BODY MASS INDEX: 42.63 KG/M2 | RESPIRATION RATE: 16 BRPM | DIASTOLIC BLOOD PRESSURE: 72 MMHG | TEMPERATURE: 98 F | SYSTOLIC BLOOD PRESSURE: 110 MMHG | HEART RATE: 87 BPM | WEIGHT: 271.63 LBS | OXYGEN SATURATION: 98 % | HEIGHT: 67 IN

## 2018-08-24 DIAGNOSIS — R05.9 COUGH: Primary | ICD-10-CM

## 2018-08-24 PROCEDURE — 3008F BODY MASS INDEX DOCD: CPT | Mod: CPTII,S$GLB,, | Performed by: FAMILY MEDICINE

## 2018-08-24 PROCEDURE — 99213 OFFICE O/P EST LOW 20 MIN: CPT | Mod: S$GLB,,, | Performed by: FAMILY MEDICINE

## 2018-08-24 PROCEDURE — 99999 PR PBB SHADOW E&M-EST. PATIENT-LVL III: CPT | Mod: PBBFAC,,, | Performed by: FAMILY MEDICINE

## 2018-08-24 RX ORDER — BENZONATATE 100 MG/1
100 CAPSULE ORAL 3 TIMES DAILY PRN
Qty: 30 CAPSULE | Refills: 3 | Status: SHIPPED | OUTPATIENT
Start: 2018-08-24 | End: 2018-09-03

## 2018-08-24 RX ORDER — LEVOCETIRIZINE DIHYDROCHLORIDE 5 MG/1
5 TABLET, FILM COATED ORAL NIGHTLY
Qty: 30 TABLET | Refills: 11 | Status: SHIPPED | OUTPATIENT
Start: 2018-08-24 | End: 2019-08-24

## 2018-08-24 NOTE — PROGRESS NOTES
"Subjective:       Patient ID: Miley Smith is a 42 y.o. female.    Chief Complaint: Pleurisy (need )    Patient presents for follow-up. She states she has been coughing a lot. She was seen in the ED and was diagnosed with pleurisy on August 1st. She was given a cough syrup, but it makes her sleepy at night. She has no fever or chills. Her cough is a dry cough. She has mild wheezing and the cough is worse at night.       Review of Systems    Objective:       Vitals:    08/24/18 1559   BP: 110/72   Pulse: 87   Resp: 16   Temp: 98.4 °F (36.9 °C)   TempSrc: Oral   SpO2: 98%   Weight: 123.2 kg (271 lb 9.7 oz)   Height: 5' 7" (1.702 m)       Physical Exam   Constitutional: She is oriented to person, place, and time. She appears well-developed and well-nourished. No distress.   HENT:   Head: Normocephalic and atraumatic.   Right Ear: External ear normal.   Left Ear: External ear normal.   Mouth/Throat: Oropharynx is clear and moist. No oropharyngeal exudate.   Neck: Normal range of motion. Neck supple.   Cardiovascular: Normal rate, regular rhythm and normal heart sounds. Exam reveals no gallop and no friction rub.   No murmur heard.  Pulmonary/Chest: Effort normal and breath sounds normal. No respiratory distress. She has no wheezes. She has no rales. She exhibits no tenderness.   Lymphadenopathy:     She has cervical adenopathy.   Neurological: She is alert and oriented to person, place, and time.   Skin: She is not diaphoretic.       Assessment:       1. Cough        Plan:       Miley was seen today for pleurisy.    Diagnoses and all orders for this visit:    Cough  -     benzonatate (TESSALON PERLES) 100 MG capsule; Take 1 capsule (100 mg total) by mouth 3 (three) times daily as needed for Cough.  -     levocetirizine (XYZAL) 5 MG tablet; Take 1 tablet (5 mg total) by mouth every evening.           "

## 2018-09-17 ENCOUNTER — HOSPITAL ENCOUNTER (EMERGENCY)
Facility: HOSPITAL | Age: 43
Discharge: HOME | End: 2018-09-17
Attending: EMERGENCY MEDICINE
Payer: COMMERCIAL

## 2018-09-17 VITALS
SYSTOLIC BLOOD PRESSURE: 124 MMHG | OXYGEN SATURATION: 97 % | HEIGHT: 67 IN | WEIGHT: 265 LBS | RESPIRATION RATE: 15 BRPM | DIASTOLIC BLOOD PRESSURE: 73 MMHG | TEMPERATURE: 98 F | BODY MASS INDEX: 41.59 KG/M2 | HEART RATE: 60 BPM

## 2018-09-17 DIAGNOSIS — R09.1 PLEURISY: Primary | ICD-10-CM

## 2018-09-17 DIAGNOSIS — R07.9 CHEST PAIN: ICD-10-CM

## 2018-09-17 LAB
ALBUMIN SERPL-MCNC: 3.7 G/DL (ref 3.3–5.5)
ALP SERPL-CCNC: 57 U/L (ref 42–141)
B-HCG UR QL: NEGATIVE
BILIRUB SERPL-MCNC: 1 MG/DL (ref 0.2–1.6)
BUN SERPL-MCNC: 8 MG/DL (ref 7–22)
CALCIUM SERPL-MCNC: 9.2 MG/DL (ref 8–10.3)
CHLORIDE SERPL-SCNC: 105 MMOL/L (ref 98–108)
CREAT SERPL-MCNC: 1 MG/DL (ref 0.6–1.2)
CTP QC/QA: YES
GLUCOSE SERPL-MCNC: 88 MG/DL (ref 73–118)
POC ALT (SGPT): 18 U/L (ref 10–47)
POC AST (SGOT): 25 U/L (ref 11–38)
POC B-TYPE NATRIURETIC PEPTIDE: 41.1 PG/ML (ref 0–100)
POC CARDIAC TROPONIN I: 0 NG/ML
POC TCO2: 25 MMOL/L (ref 18–33)
POTASSIUM BLD-SCNC: 4.1 MMOL/L (ref 3.6–5.1)
PROTEIN, POC: 7.6 G/DL (ref 6.4–8.1)
SAMPLE: NORMAL
SODIUM BLD-SCNC: 145 MMOL/L (ref 128–145)

## 2018-09-17 PROCEDURE — 96374 THER/PROPH/DIAG INJ IV PUSH: CPT

## 2018-09-17 PROCEDURE — 93005 ELECTROCARDIOGRAM TRACING: CPT

## 2018-09-17 PROCEDURE — 96375 TX/PRO/DX INJ NEW DRUG ADDON: CPT

## 2018-09-17 PROCEDURE — 25500020 PHARM REV CODE 255: Performed by: EMERGENCY MEDICINE

## 2018-09-17 PROCEDURE — 80053 COMPREHEN METABOLIC PANEL: CPT

## 2018-09-17 PROCEDURE — 93010 ELECTROCARDIOGRAM REPORT: CPT | Mod: ,,, | Performed by: INTERNAL MEDICINE

## 2018-09-17 PROCEDURE — 99285 EMERGENCY DEPT VISIT HI MDM: CPT | Mod: 25

## 2018-09-17 PROCEDURE — 81025 URINE PREGNANCY TEST: CPT | Performed by: EMERGENCY MEDICINE

## 2018-09-17 PROCEDURE — 63600175 PHARM REV CODE 636 W HCPCS: Performed by: EMERGENCY MEDICINE

## 2018-09-17 PROCEDURE — 84484 ASSAY OF TROPONIN QUANT: CPT

## 2018-09-17 PROCEDURE — 83880 ASSAY OF NATRIURETIC PEPTIDE: CPT

## 2018-09-17 PROCEDURE — 85025 COMPLETE CBC W/AUTO DIFF WBC: CPT

## 2018-09-17 RX ORDER — KETOROLAC TROMETHAMINE 30 MG/ML
15 INJECTION, SOLUTION INTRAMUSCULAR; INTRAVENOUS
Status: COMPLETED | OUTPATIENT
Start: 2018-09-17 | End: 2018-09-17

## 2018-09-17 RX ORDER — ONDANSETRON 2 MG/ML
4 INJECTION INTRAMUSCULAR; INTRAVENOUS
Status: COMPLETED | OUTPATIENT
Start: 2018-09-17 | End: 2018-09-17

## 2018-09-17 RX ORDER — NAPROXEN 375 MG/1
375 TABLET ORAL 2 TIMES DAILY WITH MEALS
Qty: 60 TABLET | Refills: 0 | Status: SHIPPED | OUTPATIENT
Start: 2018-09-17

## 2018-09-17 RX ADMIN — IOHEXOL 100 ML: 350 INJECTION, SOLUTION INTRAVENOUS at 05:09

## 2018-09-17 RX ADMIN — KETOROLAC TROMETHAMINE 15 MG: 30 INJECTION, SOLUTION INTRAMUSCULAR; INTRAVENOUS at 06:09

## 2018-09-17 RX ADMIN — ONDANSETRON 4 MG: 2 INJECTION INTRAMUSCULAR; INTRAVENOUS at 04:09

## 2018-09-17 NOTE — ED NOTES
Patient stated about midnight last night she started with left sided chest pain radiating to back  and shortness of breath   Patient stated she was awoken from sleep   Patient recently treated for pulmonary embolism   Patient with strong family history   Patient also with nausea and vomiting on way to hospital

## 2018-09-17 NOTE — ED PROVIDER NOTES
Encounter Date: 9/17/2018    SCRIBE #1 NOTE: I, Maricel Rivero, am scribing for, and in the presence of,  Dr. House. I have scribed the following portions of the note - Other sections scribed: HPI, ROS, PE.       History     Chief Complaint   Patient presents with    Chest Pain     pt reports she has been having left-sided chest pain under left breast that radiates to upper back since yesterday. pt reports she has a hx of angina and PE. Pt reports she has been nauseous and has had 1 episode of vomiting. pt denies SOB.    Leg Pain     pt reports she began having left leg pain that began last night. pt denies taking any medicine for pain     42 year old female presents to the ED complaining of left sided chest pain radiating to upper back since last night around 1AM. She describes the pain as tightness like a charley horse. She notes the pain began in her arm. She was sleeping on the left side so she rolled over to her back and began having pains in her left leg. She notes the pain everywhere began to worsen, eased up last night, but is still present today. Reports chest pain currently. Reports her pain is worse when she takes a deep breath. Denies taking any OTC mediations for pain. Reports nausea and one episode of vomiting today. She reports dyspnea on exertion. She notes in March 2018 she had similar chest pain and was Dx with Angina. She takes all prescribed medications.     She reports he has had a PE in her left lung two months ago. She has been on Xarelto since July 13th, 2018. She notes she flies a lot for her job and travels [place]. Denies recent surgery, denies birth control use, and denies smoking.    LMP August 16th and was normal.      The history is provided by the patient.     Review of patient's allergies indicates:  No Known Allergies  Past Medical History:   Diagnosis Date    Hypertension     IBS (irritable bowel syndrome)     Migraines     Pulmonary embolism      History reviewed. No  pertinent surgical history.  Family History   Problem Relation Age of Onset    Hypertension Mother     Graves' disease Mother     Deep vein thrombosis Mother     Heart disease Maternal Grandmother     Hypertension Maternal Grandmother     Deep vein thrombosis Maternal Grandmother      Social History     Tobacco Use    Smoking status: Never Smoker    Smokeless tobacco: Never Used   Substance Use Topics    Alcohol use: Yes     Comment: social    Drug use: No     Review of Systems   Constitutional: Negative for chills and fever.   Respiratory: Positive for shortness of breath.    Cardiovascular: Positive for chest pain. Negative for leg swelling.   Gastrointestinal: Positive for nausea and vomiting.   Musculoskeletal: Positive for arthralgias (leg and arm pain).   All other systems reviewed and are negative.      Physical Exam     Initial Vitals [09/17/18 1403]   BP Pulse Resp Temp SpO2   137/83 92 19 98.2 °F (36.8 °C) 97 %      MAP       --         Physical Exam    Nursing note and vitals reviewed.  Constitutional: She appears well-developed and well-nourished. She appears distressed (mild).   HENT:   Head: Normocephalic and atraumatic.   Right Ear: External ear normal.   Left Ear: External ear normal.   Nose: Nose normal.   Eyes: Conjunctivae are normal.   Neck: Normal range of motion. Neck supple.   Cardiovascular: Normal rate, regular rhythm, normal heart sounds and intact distal pulses. Exam reveals no gallop and no friction rub.    No murmur heard.  Pulmonary/Chest: Breath sounds normal. No respiratory distress. She has no wheezes. She has no rhonchi. She has no rales. She exhibits tenderness (mild).       Abdominal: Soft. There is no tenderness.   Musculoskeletal: Normal range of motion. She exhibits tenderness. She exhibits no edema.        Back:    Neurological: She is alert and oriented to person, place, and time.   Skin: Skin is warm and dry. Capillary refill takes less than 2 seconds.    Psychiatric: She has a normal mood and affect. Her behavior is normal.         ED Course   Procedures  Labs Reviewed   POCT URINE PREGNANCY          Imaging Results    None                     Scribe Attestation:   Scribe #1: I performed the above scribed service and the documentation accurately describes the services I performed. I attest to the accuracy of the note.     Medical decision making:  my independent interpretation of the EKG is normal sinus rhythm with no ST segment elevation or depression there is low voltage but no signs of alternans.  Due to this patient's history of recent pulmonary embolism she is at high risk for PE.  I will order a CTA of her chest.        ED Course as of Sep 17 1923   Mon Sep 17, 2018   1645 Test reviewed and results are within normal limits. POC B-Type Natriuretic Peptide: 41.1 []   1646 Test reviewed and results are within normal limits. Albumin, POC: 3.7 []   1646 Insert within normal POC Cardiac Troponin I: 0.00 []   1646 Test reviewed and results are within normal limits. Preg Test, Ur: Negative []      ED Course User Index  [MH] Jose Miguel House MD     Clinical Impression:     1. Chest pain        7:23 PM    7:24 PM  ED Course:    Admission on 09/17/2018   Component Date Value Ref Range Status    POC Preg Test, Ur 09/17/2018 Negative  Negative Final     Acceptable 09/17/2018 Yes   Final    Albumin, POC 09/17/2018 3.7  3.3 - 5.5 g/dL Final    Alkaline Phosphatase, POC 09/17/2018 57  42 - 141 U/L Final    ALT (SGPT), POC 09/17/2018 18  10 - 47 U/L Final    AST (SGOT), POC 09/17/2018 25  11 - 38 U/L Final    POC BUN 09/17/2018 8  7 - 22 mg/dL Final    Calcium, POC 09/17/2018 9.2  8.0 - 10.3 mg/dL Final    POC Chloride 09/17/2018 105  98 - 108 mmol/L Final    POC Creatinine 09/17/2018 1.0  0.6 - 1.2 mg/dL Final    POC Glucose 09/17/2018 88  73 - 118 mg/dL Final    POC Potassium 09/17/2018 4.1  3.6 - 5.1 mmol/L Final    POC Sodium  09/17/2018 145* 128 - 145 mmol/L Final    Bilirubin 09/17/2018 1.0  0.2 - 1.6 mg/dL Final    POC TCO2 09/17/2018 25  18 - 33 mmol/L Final    Protein 09/17/2018 7.6  6.4 - 8.1 g/dL Final    POC Cardiac Troponin I 09/17/2018 0.00  <0.09 ng/mL Final    Sample 09/17/2018 UNK   Final    POC B-Type Natriuretic Peptide 09/17/2018 41.1  0.0 - 100.0 pg/mL Final       Imaging Results          CTA Chest Non-Coronary (PE Study) (Final result)  Result time 09/17/18 17:27:57    Final result by Dennis Willingham MD (09/17/18 17:27:57)                 Impression:      No acute cardiopulmonary process identified.  Specifically, no evidence of pulmonary thromboembolism or pulmonary infarction.    Hiatal hernia.      Electronically signed by: Dennis Willingham MD  Date:    09/17/2018  Time:    17:27             Narrative:    EXAMINATION:  CTA CHEST NON CORONARY    CLINICAL HISTORY:  Chest pain, acute, PE suspected, high pretest prob;    TECHNIQUE:  Low dose axial images, sagittal and coronal reformations were obtained from the thoracic inlet to the lung bases following the IV administration of 100 mL of Omnipaque 350.  Contrast timing was optimized to evaluate the pulmonary arteries.  MIP images were performed.    COMPARISON:  CT thorax 07/11/2018    FINDINGS:  Timing of contrast bolus is adequate.  No evidence of pulmonary thromboembolism or pulmonary infarction.  The pulmonary arteries distribute normally.  There are 4 pulmonary veins.    Left-sided arch with bovine configuration.  No evidence of aortic aneurysm or dissection.  Heart is normal in size without significant pericardial fluid.  Esophagus is normal in course and contour.  There is a small to moderate-sized hiatal hernia.  No mediastinal, hilar or axillary lymphadenopathy.    Central airways are relatively midline and patent.  The lungs are symmetrically well expanded without focal consolidation, pleural effusion or pneumothorax.  Minimal dependent  atelectasis.    Structures at the base of the neck are within normal limits.  Imaged extrathoracic soft tissues are within normal limits.    Included upper abdomen is within normal limits.                               X-Ray Chest PA And Lateral (Final result)  Result time 09/17/18 14:52:23    Final result by Migue Zapata MD (09/17/18 14:52:23)                 Impression:      No acute chest disease identified.  No detrimental change noted when compared to 08/01/2018.      Electronically signed by: Migue Zapata MD  Date:    09/17/2018  Time:    14:52             Narrative:    EXAMINATION:  XR CHEST PA AND LATERAL    CLINICAL HISTORY:  SOB;    TECHNIQUE:  PA and lateral views of the chest were performed.    COMPARISON:  08/01/2018.    FINDINGS:  The cardiac silhouette and superior mediastinal structures are unremarkable. Pulmonary vasculature is within normal limits. The lungs are well aerated and free of focal consolidations. There is no evidence for pneumothorax or pleural effusions. Bony structures are grossly intact.                                  Medications   ondansetron injection 4 mg (4 mg Intravenous Given 9/17/18 1639)   omnipaque 350 iohexol 100 mL (100 mLs Intravenous Given 9/17/18 1704)   ketorolac injection 15 mg (15 mg Intravenous Given 9/17/18 1834)       Temp:  [98.2 °F (36.8 °C)] 98.2 °F (36.8 °C)  Pulse:  [62-92] 66  Resp:  [13-19] 14  SpO2:  [97 %-100 %] 100 %  BP: (108-137)/(57-83) 112/71    REASSESSMENT:  Improved    IMP:  Chest pain, pleurisy      PLAN:  Naproxen             Jose Miguel House MD  09/17/18 8105

## 2018-10-01 ENCOUNTER — OFFICE VISIT (OUTPATIENT)
Dept: URGENT CARE | Facility: CLINIC | Age: 43
End: 2018-10-01
Payer: COMMERCIAL

## 2018-10-01 VITALS
DIASTOLIC BLOOD PRESSURE: 78 MMHG | HEIGHT: 67 IN | SYSTOLIC BLOOD PRESSURE: 148 MMHG | TEMPERATURE: 98 F | HEART RATE: 96 BPM | OXYGEN SATURATION: 99 % | WEIGHT: 265 LBS | BODY MASS INDEX: 41.59 KG/M2 | RESPIRATION RATE: 16 BRPM

## 2018-10-01 DIAGNOSIS — J06.9 VIRAL URI WITH COUGH: Primary | ICD-10-CM

## 2018-10-01 DIAGNOSIS — R05.9 COUGH: ICD-10-CM

## 2018-10-01 PROCEDURE — 71046 X-RAY EXAM CHEST 2 VIEWS: CPT | Mod: S$GLB,,, | Performed by: RADIOLOGY

## 2018-10-01 PROCEDURE — 3008F BODY MASS INDEX DOCD: CPT | Mod: CPTII,S$GLB,, | Performed by: NURSE PRACTITIONER

## 2018-10-01 PROCEDURE — 99214 OFFICE O/P EST MOD 30 MIN: CPT | Mod: S$GLB,,, | Performed by: NURSE PRACTITIONER

## 2018-10-01 RX ORDER — BENZONATATE 100 MG/1
100 CAPSULE ORAL EVERY 6 HOURS PRN
Qty: 30 CAPSULE | Refills: 1 | Status: SHIPPED | OUTPATIENT
Start: 2018-10-01 | End: 2018-10-31

## 2018-10-01 RX ORDER — CODEINE PHOSPHATE AND GUAIFENESIN 10; 100 MG/5ML; MG/5ML
10 SOLUTION ORAL EVERY 6 HOURS PRN
Qty: 120 ML | Refills: 0 | Status: SHIPPED | OUTPATIENT
Start: 2018-10-01 | End: 2018-10-11

## 2018-10-02 NOTE — PROGRESS NOTES
"Subjective:       Patient ID: Miley Smith is a 42 y.o. female.    Vitals:  height is 5' 7" (1.702 m) and weight is 120.2 kg (265 lb). Her temperature is 97.5 °F (36.4 °C). Her blood pressure is 148/78 (abnormal) and her pulse is 96. Her respiration is 16 and oxygen saturation is 99%.     Chief Complaint: Cough    Pt reports recent hx of PE (July).    Also reports pleurisy (Dx'd late August).      Cough   This is a new problem. The current episode started in the past 7 days. The problem has been unchanged. The problem occurs constantly. The cough is non-productive. Pertinent negatives include no chest pain, chills, ear pain, eye redness, fever, headaches, myalgias, sore throat, shortness of breath or wheezing.     Review of Systems   Constitution: Negative for chills, fever and malaise/fatigue.   HENT: Negative for congestion, ear pain, hoarse voice and sore throat.    Eyes: Negative for discharge and redness.   Cardiovascular: Negative for chest pain, dyspnea on exertion and leg swelling.   Respiratory: Positive for cough. Negative for shortness of breath, sputum production and wheezing.    Musculoskeletal: Negative for myalgias.   Gastrointestinal: Negative for abdominal pain and nausea.   Neurological: Negative for headaches.       Objective:      Physical Exam   Constitutional: She is oriented to person, place, and time. She appears well-developed and well-nourished. She is cooperative.  Non-toxic appearance. She does not appear ill. No distress.   HENT:   Head: Normocephalic and atraumatic.   Right Ear: Hearing, tympanic membrane and ear canal normal.   Left Ear: Hearing, tympanic membrane and ear canal normal.   Nose: Nose normal. No mucosal edema, rhinorrhea or nasal deformity. No epistaxis. Right sinus exhibits no maxillary sinus tenderness and no frontal sinus tenderness. Left sinus exhibits no maxillary sinus tenderness and no frontal sinus tenderness.   Mouth/Throat: Uvula is midline and " mucous membranes are normal. No trismus in the jaw. Normal dentition. No uvula swelling. No posterior oropharyngeal erythema.   Eyes: Conjunctivae and lids are normal. No scleral icterus.   Sclera clear bilat   Neck: Trachea normal, full passive range of motion without pain and phonation normal. Neck supple.   Cardiovascular: Normal rate, regular rhythm, normal heart sounds, intact distal pulses and normal pulses.   Pulmonary/Chest: Effort normal and breath sounds normal. No respiratory distress.   Abdominal: Soft. Normal appearance and bowel sounds are normal. She exhibits no distension. There is no tenderness.   Musculoskeletal: Normal range of motion. She exhibits no edema or deformity.   Neurological: She is alert and oriented to person, place, and time. She exhibits normal muscle tone. Coordination normal.   Skin: Skin is warm, dry and intact. She is not diaphoretic. No pallor.   Psychiatric: She has a normal mood and affect. Her speech is normal and behavior is normal. Judgment and thought content normal. Cognition and memory are normal.   Nursing note and vitals reviewed.      Assessment:       1. Viral URI with cough    2. Cough        Plan:       Patient Instructions     Patient Instructions   Please drink plenty of fluids.  Please get plenty of rest.  Please return here or go to the Emergency Department for any concerns or worsening of condition.  If you were prescribed antibiotics, please take them to completion.  If you were given wait & see antibiotics, please wait 5-7 days before taking them, and only take them if your symptoms have worsened or not improved.  If you do begin taking the antibiotics, please take them to completion.  If you were prescribed a narcotic medication, do not drive or operate heavy equipment or machinery while taking these medications.  If you were given a steroid shot in the clinic and have also been given a prescription for a steroid such as Prednisone or a Medrol Dose Pack,  please begin taking them tomorrow.  If you do not have Hypertension or any history of palpitations, it is ok to take over the counter Sudafed or Mucinex D or Allegra-D or Claritin-D or Zyrtec-D.  If you do take one of the above, it is ok to combine that with plain over the counter Mucinex or Allegra or Claritin or Zyrtec.  If for example you are taking Zyrtec -D, you can combine that with Mucinex, but not Mucinex-D.  If you are taking Mucinex-D, you can combine that with plain Allegra or Claritin or Zyrtec.   If you do have Hypertension or palpitations, it is safe to take Coricidin HBP for relief of sinus symptoms.  If not allergic, please take over the counter Tylenol (Acetaminophen) and/or Motrin (Ibuprofen) as directed for control of pain and/or fever.  Please follow up with your primary care doctor or specialist as needed.     If you  smoke, please stop smoking.  Viral Upper Respiratory Illness (Adult)  You have a viral upper respiratory illness (URI), which is another term for the common cold. This illness is contagious during the first few days. It is spread through the air by coughing and sneezing. It may also be spread by direct contact (touching the sick person and then touching your own eyes, nose, or mouth). Frequent handwashing will decrease risk of spread. Most viral illnesses go away within 7 to 10 days with rest and simple home remedies. Sometimes the illness may last for several weeks. Antibiotics will not kill a virus, and they are generally not prescribed for this condition.    Home care  · If symptoms are severe, rest at home for the first 2 to 3 days. When you resume activity, don't let yourself get too tired.  · Avoid being exposed to cigarette smoke (yours or others).  · You may use acetaminophen or ibuprofen to control pain and fever, unless another medicine was prescribed. (Note: If you have chronic liver or kidney disease, have ever had a stomach ulcer or gastrointestinal bleeding, or are  taking blood-thinning medicines, talk with your healthcare provider before using these medicines.) Aspirin should never be given to anyone under 18 years of age who is ill with a viral infection or fever. It may cause severe liver or brain damage.  · Your appetite may be poor, so a light diet is fine. Avoid dehydration by drinking 6 to 8 glasses of fluids per day (water, soft drinks, juices, tea, or soup). Extra fluids will help loosen secretions in the nose and lungs.  · Over-the-counter cold medicines will not shorten the length of time youre sick, but they may be helpful for the following symptoms: cough, sore throat, and nasal and sinus congestion. (Note: Do not use decongestants if you have high blood pressure.)  Follow-up care  Follow up with your healthcare provider, or as advised.  When to seek medical advice  Call your healthcare provider right away if any of these occur:  · Cough with lots of colored sputum (mucus)  · Severe headache; face, neck, or ear pain  · Difficulty swallowing due to throat pain  · Fever of 100.4°F (38°C)  Call 911, or get immediate medical care  Call emergency services right away if any of these occur:  · Chest pain, shortness of breath, wheezing, or difficulty breathing  · Coughing up blood  · Inability to swallow due to throat pain  Date Last Reviewed: 9/13/2015  © 7236-5923 [employer]. [address] [place] [address]. All rights reserved. This information is not intended as a substitute for professional medical care. Always follow your healthcare professional's instructions.                  Viral URI with cough    Cough  -     XR CHEST PA AND LATERAL; Future; Expected date: 10/01/2018    Other orders  -     benzonatate (TESSALON PERLES) 100 MG capsule; Take 1 capsule (100 mg total) by mouth every 6 (six) hours as needed for Cough.  Dispense: 30 capsule; Refill: 1  -     guaifenesin-codeine 100-10 mg/5 ml (CHERATUSSIN AC)  mg/5 mL syrup; Take 10  mLs by mouth every 6 (six) hours as needed for Cough.  Dispense: 120 mL; Refill: 0

## 2018-10-02 NOTE — PATIENT INSTRUCTIONS
Patient Instructions   Please drink plenty of fluids.  Please get plenty of rest.  Please return here or go to the Emergency Department for any concerns or worsening of condition.  If you were prescribed antibiotics, please take them to completion.  If you were given wait & see antibiotics, please wait 5-7 days before taking them, and only take them if your symptoms have worsened or not improved.  If you do begin taking the antibiotics, please take them to completion.  If you were prescribed a narcotic medication, do not drive or operate heavy equipment or machinery while taking these medications.  If you were given a steroid shot in the clinic and have also been given a prescription for a steroid such as Prednisone or a Medrol Dose Pack, please begin taking them tomorrow.  If you do not have Hypertension or any history of palpitations, it is ok to take over the counter Sudafed or Mucinex D or Allegra-D or Claritin-D or Zyrtec-D.  If you do take one of the above, it is ok to combine that with plain over the counter Mucinex or Allegra or Claritin or Zyrtec.  If for example you are taking Zyrtec -D, you can combine that with Mucinex, but not Mucinex-D.  If you are taking Mucinex-D, you can combine that with plain Allegra or Claritin or Zyrtec.   If you do have Hypertension or palpitations, it is safe to take Coricidin HBP for relief of sinus symptoms.  If not allergic, please take over the counter Tylenol (Acetaminophen) and/or Motrin (Ibuprofen) as directed for control of pain and/or fever.  Please follow up with your primary care doctor or specialist as needed.     If you  smoke, please stop smoking.  Viral Upper Respiratory Illness (Adult)  You have a viral upper respiratory illness (URI), which is another term for the common cold. This illness is contagious during the first few days. It is spread through the air by coughing and sneezing. It may also be spread by direct contact (touching the sick person and then  touching your own eyes, nose, or mouth). Frequent handwashing will decrease risk of spread. Most viral illnesses go away within 7 to 10 days with rest and simple home remedies. Sometimes the illness may last for several weeks. Antibiotics will not kill a virus, and they are generally not prescribed for this condition.    Home care  · If symptoms are severe, rest at home for the first 2 to 3 days. When you resume activity, don't let yourself get too tired.  · Avoid being exposed to cigarette smoke (yours or others).  · You may use acetaminophen or ibuprofen to control pain and fever, unless another medicine was prescribed. (Note: If you have chronic liver or kidney disease, have ever had a stomach ulcer or gastrointestinal bleeding, or are taking blood-thinning medicines, talk with your healthcare provider before using these medicines.) Aspirin should never be given to anyone under 18 years of age who is ill with a viral infection or fever. It may cause severe liver or brain damage.  · Your appetite may be poor, so a light diet is fine. Avoid dehydration by drinking 6 to 8 glasses of fluids per day (water, soft drinks, juices, tea, or soup). Extra fluids will help loosen secretions in the nose and lungs.  · Over-the-counter cold medicines will not shorten the length of time youre sick, but they may be helpful for the following symptoms: cough, sore throat, and nasal and sinus congestion. (Note: Do not use decongestants if you have high blood pressure.)  Follow-up care  Follow up with your healthcare provider, or as advised.  When to seek medical advice  Call your healthcare provider right away if any of these occur:  · Cough with lots of colored sputum (mucus)  · Severe headache; face, neck, or ear pain  · Difficulty swallowing due to throat pain  · Fever of 100.4°F (38°C)  Call 911, or get immediate medical care  Call emergency services right away if any of these occur:  · Chest pain, shortness of breath,  wheezing, or difficulty breathing  · Coughing up blood  · Inability to swallow due to throat pain  Date Last Reviewed: 9/13/2015  © 7056-6669 [employer]. [address] [place] [address], [place] [address]. All rights reserved. This information is not intended as a substitute for professional medical care. Always follow your healthcare professional's instructions.

## 2018-10-15 ENCOUNTER — OFFICE VISIT (OUTPATIENT)
Dept: FAMILY MEDICINE | Facility: CLINIC | Age: 43
End: 2018-10-15
Payer: COMMERCIAL

## 2018-10-15 ENCOUNTER — LAB VISIT (OUTPATIENT)
Dept: LAB | Facility: HOSPITAL | Age: 43
End: 2018-10-15
Attending: FAMILY MEDICINE
Payer: COMMERCIAL

## 2018-10-15 VITALS
HEART RATE: 93 BPM | HEIGHT: 67 IN | DIASTOLIC BLOOD PRESSURE: 80 MMHG | BODY MASS INDEX: 41.87 KG/M2 | TEMPERATURE: 98 F | OXYGEN SATURATION: 100 % | SYSTOLIC BLOOD PRESSURE: 130 MMHG | WEIGHT: 266.75 LBS

## 2018-10-15 DIAGNOSIS — I26.99 OTHER PULMONARY EMBOLISM WITHOUT ACUTE COR PULMONALE, UNSPECIFIED CHRONICITY: ICD-10-CM

## 2018-10-15 DIAGNOSIS — I10 ESSENTIAL HYPERTENSION: Primary | Chronic | ICD-10-CM

## 2018-10-15 LAB — D DIMER PPP IA.FEU-MCNC: <0.19 MG/L FEU

## 2018-10-15 PROCEDURE — 99999 PR PBB SHADOW E&M-EST. PATIENT-LVL III: CPT | Mod: PBBFAC,,, | Performed by: FAMILY MEDICINE

## 2018-10-15 PROCEDURE — 99214 OFFICE O/P EST MOD 30 MIN: CPT | Mod: S$GLB,,, | Performed by: FAMILY MEDICINE

## 2018-10-15 PROCEDURE — 85379 FIBRIN DEGRADATION QUANT: CPT

## 2018-10-15 PROCEDURE — 3008F BODY MASS INDEX DOCD: CPT | Mod: CPTII,S$GLB,, | Performed by: FAMILY MEDICINE

## 2018-10-15 PROCEDURE — 36415 COLL VENOUS BLD VENIPUNCTURE: CPT

## 2018-10-15 RX ORDER — TIZANIDINE 4 MG/1
4 TABLET ORAL EVERY 6 HOURS PRN
Qty: 60 TABLET | Refills: 0 | Status: SHIPPED | OUTPATIENT
Start: 2018-10-15 | End: 2018-10-25

## 2018-10-15 NOTE — PROGRESS NOTES
"Chief Complaint   Patient presents with    Leg Pain       SUBJECTIVE:  Miley Smith is a 42 y.o. female here for follow up of leg pains with DVT though to be possible she just noted they were swelling and she had pain in the posterior calf, she has h/o PE and is on xarelto and compliant >1 week and no bleeding history noted, she is just very worried.  The patient is taking hypertensive medications compliantly without side effects.  Denies chest pain, dyspnea, edema, or TIA's.  .  Currently has co-morbidities including per problem list.      Social History     Tobacco Use    Smoking status: Never Smoker    Smokeless tobacco: Never Used   Substance Use Topics    Alcohol use: Yes     Comment: social    Drug use: No       Patient Active Problem List    Diagnosis Date Noted    Pleurisy     Angina at rest 07/11/2018    Pulmonary embolus 07/11/2018    Essential hypertension 07/11/2018    Hyperlipidemia 07/11/2018    Class 3 severe obesity due to excess calories with serious comorbidity and body mass index (BMI) of 40.0 to 44.9 in adult 07/11/2018     Body mass index is 42.29 kg/m².      GERD (gastroesophageal reflux disease) 11/04/2013    IBS (irritable bowel syndrome) 03/05/2013       Review of Systems   Constitutional: Positive for malaise/fatigue.        Feeling better since the PE slowly but her medications make her feel very bad   Respiratory: Negative for shortness of breath.    Cardiovascular: Negative for chest pain and palpitations.        No tachycardia     Musculoskeletal: Positive for myalgias.        Leg pain     Psychiatric/Behavioral: The patient is nervous/anxious.        OBJECTIVE:  /80   Pulse 93   Temp 98.1 °F (36.7 °C) (Oral)   Ht 5' 7" (1.702 m)   Wt 121 kg (266 lb 12.1 oz)   LMP 09/28/2018   SpO2 100%   BMI 41.78 kg/m²     Wt Readings from Last 3 Encounters:   10/15/18 121 kg (266 lb 12.1 oz)   10/01/18 120.2 kg (265 lb)   09/17/18 120.2 kg (265 lb)     BP " Readings from Last 3 Encounters:   10/15/18 130/80   10/01/18 (!) 148/78   09/17/18 124/73       She appears well, in no apparent distress.  Alert and oriented times three, pleasant and cooperative. Vital signs are as documented in vital signs section.  Obese  S1 and S2 normal, no murmurs, clicks, gallops or rubs. Regular rate and rhythm. Chest is clear; no wheezes or rales. No edema or JVD.  Legs without and erythema, noted assymetric swelling or cords      Review of old Records:  reveiwed per [facility]    Review of old labs:    Reviewed last labs    Review of old imaging:  n/a      ASSESSMENT:  Problem List Items Addressed This Visit     Pulmonary embolus    Relevant Orders    D dimer, quantitative    Essential hypertension - Primary (Chronic)          ICD-10-CM ICD-9-CM   1. Essential hypertension I10 401.9   2. Other pulmonary embolism without acute cor pulmonale, unspecified chronicity I26.99 415.19               PLAN:       Has trouble with the medications side effects.    We will help her review all of that    Try to get her in digital HTN program    D dimer to be sure no real risk  She is on xarelto and compliant   Chance of PE/DVT recurrence is unlikely.    If this si negative work more on her weight and lifestyle to help with her symptoms.    I spent 25 minutes with patient with half in face to face counseling about the above.      No future appointments.       Medication List           Accurate as of 10/15/18  5:24 PM. If you have any questions, ask your nurse or doctor.               CONTINUE taking these medications    benzonatate 100 MG capsule  Commonly known as:  TESSALON PERLES  Take 1 capsule (100 mg total) by mouth every 6 (six) hours as needed for Cough.     esomeprazole 40 MG capsule  Commonly known as:  NEXIUM  TAKE ONE CAPSULE BY MOUTH TWICE DAILY     isosorbide mononitrate 30 MG 24 hr tablet  Commonly known as:  IMDUR     levocetirizine 5 MG tablet  Commonly known as:  XYZAL  Take 1 tablet (5 mg  total) by mouth every evening.     lisinopril-hydrochlorothiazide 10-12.5 mg per tablet  Commonly known as:  PRINZIDE,ZESTORETIC     naproxen 375 MG tablet  Commonly known as:  NAPROSYN  Take 1 tablet (375 mg total) by mouth 2 (two) times daily with meals.     pravastatin 40 MG tablet  Commonly known as:  PRAVACHOL     rivaroxaban 20 mg Tab  Commonly known as:  XARELTO  Take 1 tablet (20 mg total) by mouth daily with dinner or evening meal.            No Follow-up on file.

## 2018-11-27 ENCOUNTER — OFFICE VISIT (OUTPATIENT)
Dept: FAMILY MEDICINE | Facility: CLINIC | Age: 43
End: 2018-11-27
Payer: COMMERCIAL

## 2018-11-27 VITALS
HEIGHT: 67 IN | DIASTOLIC BLOOD PRESSURE: 88 MMHG | SYSTOLIC BLOOD PRESSURE: 134 MMHG | OXYGEN SATURATION: 99 % | WEIGHT: 268.94 LBS | HEART RATE: 75 BPM | BODY MASS INDEX: 42.21 KG/M2 | TEMPERATURE: 98 F | RESPIRATION RATE: 20 BRPM

## 2018-11-27 DIAGNOSIS — B34.9 VIRAL SYNDROME: Primary | ICD-10-CM

## 2018-11-27 PROCEDURE — 3008F BODY MASS INDEX DOCD: CPT | Mod: CPTII,S$GLB,, | Performed by: PHYSICIAN ASSISTANT

## 2018-11-27 PROCEDURE — 99999 PR PBB SHADOW E&M-EST. PATIENT-LVL IV: CPT | Mod: PBBFAC,,, | Performed by: PHYSICIAN ASSISTANT

## 2018-11-27 PROCEDURE — 99213 OFFICE O/P EST LOW 20 MIN: CPT | Mod: S$GLB,,, | Performed by: PHYSICIAN ASSISTANT

## 2018-11-27 RX ORDER — BENZONATATE 100 MG/1
100 CAPSULE ORAL 3 TIMES DAILY PRN
Qty: 30 CAPSULE | Refills: 0 | Status: SHIPPED | OUTPATIENT
Start: 2018-11-27 | End: 2018-12-27

## 2018-11-27 NOTE — PROGRESS NOTES
Tetanus Vaccine: Pt declined  Pap Smear: Pt will schedule  Influenza Vaccine: Pt declined due to fever

## 2018-11-27 NOTE — LETTER
November 27, 2018     [facility]  Family Medicine  [address]  [address]  [place] [address]  Phone: [phone]  Fax: [phone]   November 27, 2018     Patient: Miley Smith   YOB: 1975   Date of Visit: 11/27/2018       To Whom it May Concern:    Miley Smith was seen in my clinic on 11/27/2018. She may return to work on 11/28/18.    If you have any questions or concerns, please don't hesitate to call.    Sincerely,         WALT Pittman

## 2018-11-27 NOTE — PROGRESS NOTES
Subjective:       Patient ID: Miley Smith is a 43 y.o. female with multiple medical diagnoses as listed in the medical history and problem list that presents for Generalized Body Aches and Night Sweats  .    Chief Complaint: Generalized Body Aches and Night Sweats      URI    This is a new problem. The current episode started yesterday. The problem has been gradually worsening. There has been no fever. Associated symptoms include coughing (dry ), diarrhea (last night once this am --one episode but a long one ), headaches, a sore throat and wheezing. Pertinent negatives include no abdominal pain, chest pain, congestion, ear pain, nausea, rhinorrhea, sinus pain, sneezing or vomiting. She has tried nothing for the symptoms.     Review of Systems   Constitutional: Positive for appetite change, chills and fatigue. Negative for fever.        Sweats    HENT: Positive for postnasal drip, sore throat and trouble swallowing. Negative for congestion, ear pain, rhinorrhea, sinus pressure, sinus pain and sneezing.    Eyes: Negative for pain, discharge, redness and itching.   Respiratory: Positive for cough (dry ), chest tightness and wheezing. Negative for shortness of breath.    Cardiovascular: Negative for chest pain and palpitations.   Gastrointestinal: Positive for diarrhea (last night once this am --one episode but a long one ). Negative for abdominal pain, constipation, nausea and vomiting.   Musculoskeletal: Positive for myalgias.   Neurological: Positive for headaches.         PAST MEDICAL HISTORY:  Past Medical History:   Diagnosis Date    Hypertension     IBS (irritable bowel syndrome)     Migraines     Pulmonary embolism     Sleep apnea        SOCIAL HISTORY:  Social History     Socioeconomic History    Marital status:      Spouse name: Not on file    Number of children: Not on file    Years of education: Not on file    Highest education level: Not on file   Social Needs    Financial  "resource strain: Not on file    Food insecurity - worry: Not on file    Food insecurity - inability: Not on file    Transportation needs - medical: Not on file    Transportation needs - non-medical: Not on file   Occupational History    Not on file   Tobacco Use    Smoking status: Never Smoker    Smokeless tobacco: Never Used   Substance and Sexual Activity    Alcohol use: Yes     Comment: social    Drug use: No    Sexual activity: Yes   Other Topics Concern    Not on file   Social History Narrative    , .   Two children.  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.       ALLERGIES AND MEDICATIONS: updated and reviewed.  Review of patient's allergies indicates:  No Known Allergies  Current Outpatient Medications   Medication Sig Dispense Refill    esomeprazole (NEXIUM) 40 MG capsule TAKE ONE CAPSULE BY MOUTH TWICE DAILY 180 capsule 0    isosorbide mononitrate (IMDUR) 30 MG 24 hr tablet Take 30 mg by mouth once daily.      lisinopril-hydrochlorothiazide (PRINZIDE,ZESTORETIC) 10-12.5 mg per tablet Take 1 tablet by mouth once daily.      pravastatin (PRAVACHOL) 40 MG tablet Take 40 mg by mouth once daily.      rivaroxaban (XARELTO) 20 mg Tab Take 1 tablet (20 mg total) by mouth daily with dinner or evening meal. 30 tablet 3    benzonatate (TESSALON) 100 MG capsule Take 1 capsule (100 mg total) by mouth 3 (three) times daily as needed for Cough. 30 capsule 0    levocetirizine (XYZAL) 5 MG tablet Take 1 tablet (5 mg total) by mouth every evening. 30 tablet 11    naproxen (NAPROSYN) 375 MG tablet Take 1 tablet (375 mg total) by mouth 2 (two) times daily with meals. 60 tablet 0     No current facility-administered medications for this visit.          Objective:   /88 (BP Location: Left arm, Patient Position: Sitting, BP Method: Large (Manual))   Pulse 75   Temp 98.3 °F (36.8 °C) (Oral)   Resp 20   Ht 5' 7" (1.702 m)   Wt 122 kg (268 lb 15.4 oz)   LMP 11/11/2018   SpO2 99%   " BMI 42.13 kg/m²      Physical Exam   Constitutional: She is oriented to person, place, and time. No distress.   HENT:   Head: Normocephalic and atraumatic.   Right Ear: External ear and ear canal normal. Tympanic membrane is not injected. No middle ear effusion.   Left Ear: External ear and ear canal normal. Tympanic membrane is not injected.  No middle ear effusion.   Nose: No mucosal edema or rhinorrhea. Right sinus exhibits no maxillary sinus tenderness and no frontal sinus tenderness. Left sinus exhibits no maxillary sinus tenderness and no frontal sinus tenderness.   Mouth/Throat: Uvula is midline and mucous membranes are normal. Posterior oropharyngeal erythema present. No oropharyngeal exudate.   Eyes: Conjunctivae and EOM are normal.   Cardiovascular: Normal rate and regular rhythm.   Pulmonary/Chest: Effort normal and breath sounds normal. She has no wheezes.   Lymphadenopathy:     She has no cervical adenopathy.   Neurological: She is oriented to person, place, and time.   Skin: Skin is warm.           Assessment:       1. Viral syndrome        Plan:       Viral syndrome  -     benzonatate (TESSALON) 100 MG capsule; Take 1 capsule (100 mg total) by mouth 3 (three) times daily as needed for Cough.  Dispense: 30 capsule; Refill: 0  xyzal she has  Tylenol for aches    Call with any new symptoms           No Follow-up on file.

## 2018-12-03 ENCOUNTER — HOSPITAL ENCOUNTER (EMERGENCY)
Facility: HOSPITAL | Age: 43
Discharge: HOME | End: 2018-12-03
Attending: EMERGENCY MEDICINE
Payer: COMMERCIAL

## 2018-12-03 VITALS
DIASTOLIC BLOOD PRESSURE: 90 MMHG | OXYGEN SATURATION: 100 % | BODY MASS INDEX: 41.59 KG/M2 | HEART RATE: 86 BPM | WEIGHT: 265 LBS | TEMPERATURE: 98 F | SYSTOLIC BLOOD PRESSURE: 145 MMHG | RESPIRATION RATE: 18 BRPM | HEIGHT: 67 IN

## 2018-12-03 DIAGNOSIS — R51.9 ACUTE NONINTRACTABLE HEADACHE, UNSPECIFIED HEADACHE TYPE: Primary | ICD-10-CM

## 2018-12-03 LAB
B-HCG UR QL: NEGATIVE
CTP QC/QA: YES

## 2018-12-03 PROCEDURE — 96374 THER/PROPH/DIAG INJ IV PUSH: CPT

## 2018-12-03 PROCEDURE — 81025 URINE PREGNANCY TEST: CPT | Performed by: EMERGENCY MEDICINE

## 2018-12-03 PROCEDURE — 25000003 PHARM REV CODE 250: Performed by: NURSE PRACTITIONER

## 2018-12-03 PROCEDURE — 63600175 PHARM REV CODE 636 W HCPCS: Performed by: NURSE PRACTITIONER

## 2018-12-03 PROCEDURE — 96361 HYDRATE IV INFUSION ADD-ON: CPT

## 2018-12-03 PROCEDURE — 99284 EMERGENCY DEPT VISIT MOD MDM: CPT | Mod: 25

## 2018-12-03 PROCEDURE — 96375 TX/PRO/DX INJ NEW DRUG ADDON: CPT

## 2018-12-03 RX ORDER — PROCHLORPERAZINE EDISYLATE 5 MG/ML
10 INJECTION INTRAMUSCULAR; INTRAVENOUS
Status: COMPLETED | OUTPATIENT
Start: 2018-12-03 | End: 2018-12-03

## 2018-12-03 RX ORDER — SODIUM CHLORIDE 9 MG/ML
1000 INJECTION, SOLUTION INTRAVENOUS
Status: COMPLETED | OUTPATIENT
Start: 2018-12-03 | End: 2018-12-03

## 2018-12-03 RX ORDER — ONDANSETRON 2 MG/ML
4 INJECTION INTRAMUSCULAR; INTRAVENOUS
Status: COMPLETED | OUTPATIENT
Start: 2018-12-03 | End: 2018-12-03

## 2018-12-03 RX ORDER — DIPHENHYDRAMINE HYDROCHLORIDE 50 MG/ML
25 INJECTION INTRAMUSCULAR; INTRAVENOUS
Status: COMPLETED | OUTPATIENT
Start: 2018-12-03 | End: 2018-12-03

## 2018-12-03 RX ADMIN — DIPHENHYDRAMINE HYDROCHLORIDE 25 MG: 50 INJECTION, SOLUTION INTRAMUSCULAR; INTRAVENOUS at 07:12

## 2018-12-03 RX ADMIN — ONDANSETRON 4 MG: 2 INJECTION INTRAMUSCULAR; INTRAVENOUS at 07:12

## 2018-12-03 RX ADMIN — SODIUM CHLORIDE 1000 ML: 0.9 INJECTION, SOLUTION INTRAVENOUS at 07:12

## 2018-12-03 RX ADMIN — PROCHLORPERAZINE EDISYLATE 10 MG: 5 INJECTION INTRAMUSCULAR; INTRAVENOUS at 07:12

## 2018-12-04 NOTE — ED PROVIDER NOTES
Encounter Date: 12/3/2018       History     Chief Complaint   Patient presents with    Headache     pt reports she has been having a left-sided headache with intermittent nausea since 3pm today. denies taking any medicine for pain. denies chest pain, sob, dizziness or any other associated symptoms     A 43 years old female with c/o an intermittent left-sided headache with nausea which started around 1500. Pt denies taking any pain meds. Pt has a hx of IBS, Pulmonary embolism, Migraine headache, and HTN. Pt denies head injury. Pt states it feel like her typical migraine headache. Pt states this is not the worst headache of her life. Pt is not currently on any headache meds. Pt denies chest pains, SOB, fever, chills, or neck stiffness.       The history is provided by the patient.   Headache    This is a recurrent problem. The current episode started today. The problem occurs intermittently. The problem has been gradually worsening. The pain is located in the left unilateral region. The pain does not radiate. The pain quality is similar to prior headaches. The quality of the pain is described as throbbing. The pain is at a severity of 10/10. Associated symptoms include nausea and photophobia. Pertinent negatives include no abdominal pain, back pain, dizziness, ear pain, fever, neck pain, phonophobia, scalp tenderness, seizures, vomiting or weakness. The symptoms are aggravated by bright light. She has tried nothing for the symptoms. Her past medical history is significant for migraine headaches.     Review of patient's allergies indicates:  No Known Allergies  Past Medical History:   Diagnosis Date    Hypertension     IBS (irritable bowel syndrome)     Migraines     Pulmonary embolism     Sleep apnea      History reviewed. No pertinent surgical history.  Family History   Problem Relation Age of Onset    Hypertension Mother     Graves' disease Mother     Deep vein thrombosis Mother     Heart disease Maternal  Grandmother     Hypertension Maternal Grandmother     Deep vein thrombosis Maternal Grandmother      Social History     Tobacco Use    Smoking status: Never Smoker    Smokeless tobacco: Never Used   Substance Use Topics    Alcohol use: Yes     Comment: social    Drug use: No     Review of Systems   Constitutional: Negative.  Negative for fever.   HENT: Negative.  Negative for ear pain.    Eyes: Positive for photophobia.   Respiratory: Negative.  Negative for shortness of breath.    Cardiovascular: Negative.  Negative for chest pain.   Gastrointestinal: Positive for nausea. Negative for abdominal pain and vomiting.   Endocrine: Negative.    Genitourinary: Negative.    Musculoskeletal: Negative.  Negative for back pain and neck pain.   Skin: Negative.    Allergic/Immunologic: Negative.    Neurological: Positive for headaches. Negative for dizziness, seizures, weakness and light-headedness.   Hematological: Negative.    All other systems reviewed and are negative.      Physical Exam     Initial Vitals [12/03/18 1708]   BP Pulse Resp Temp SpO2   131/84 72 18 97.8 °F (36.6 °C) 100 %      MAP       --         Physical Exam    Nursing note and vitals reviewed.  Constitutional: Vital signs are normal. She appears well-developed. She is cooperative. She does not appear ill.   HENT:   Head: Normocephalic.   Right Ear: External ear normal.   Left Ear: External ear normal.   Nose: Nose normal.   Mouth/Throat: Oropharynx is clear and moist.   Eyes: Conjunctivae, EOM and lids are normal. Pupils are equal, round, and reactive to light.   Neck: Normal range of motion. Neck supple.   Cardiovascular: Normal rate, regular rhythm, S1 normal, S2 normal and normal heart sounds.   Pulmonary/Chest: Effort normal and breath sounds normal.   Abdominal: Soft. Normal appearance and bowel sounds are normal. There is no tenderness.   Musculoskeletal: Normal range of motion.   Neurological: She is alert and oriented to person, place, and  time. GCS eye subscore is 4. GCS verbal subscore is 5. GCS motor subscore is 6.   CN II-XII intact    Skin: Skin is warm, dry and intact.   Psychiatric: She has a normal mood and affect. Her speech is normal. Thought content normal. Cognition and memory are normal.         ED Course   Procedures  Labs Reviewed   POCT URINE PREGNANCY          Imaging Results    None          Medical Decision Making:   Initial Assessment:   A 43 years old female with c/o an intermittent left-sided headache with nausea which started around 1500. Pt denies taking any pain meds. Pt has a hx of IBS, Pulmonary embolism, Migraine headache, and HTN. Pt denies head injury. Pt states it feel like her typical migraine headache. Pt states this is not the worst headache of her life. Pt is not currently on any headache meds. Pt denies chest pains, SOB, fever, chills, or neck stiffness.     Differential Diagnosis:   Migraine headache, Tension headache, Cluster headache  ED Management:  Medicated with Compazine, Benadryl and Zofran. Headache and nausea resolved.  Pt was given 1 L Normal saline IV.  Follow-up with PCP in am.   Return to ED for worsening of symptoms.                         Clinical Impression:   The encounter diagnosis was Acute nonintractable headache, unspecified headache type.                             MARCY Castro  12/04/18 [id number]

## 2018-12-04 NOTE — ED NOTES
UPDATED PT ON PLAN OF CARE, PT VERBALIZES UNDERSTANDING AND DENIES ANY NEEDS AT PRESENT TIME. CALL BELL IN REACH AND ENCOURAGED TO CALL FOR ANY NEEDS OR CONCERNS

## 2019-03-29 LAB — HM MAMMOGRAM: NEGATIVE

## 2019-04-01 ENCOUNTER — HOSPITAL ENCOUNTER (EMERGENCY)
Facility: HOSPITAL | Age: 44
Discharge: HOME | End: 2019-04-01
Attending: EMERGENCY MEDICINE
Payer: COMMERCIAL

## 2019-04-01 VITALS
DIASTOLIC BLOOD PRESSURE: 75 MMHG | RESPIRATION RATE: 19 BRPM | WEIGHT: 266 LBS | OXYGEN SATURATION: 100 % | HEART RATE: 86 BPM | TEMPERATURE: 98 F | BODY MASS INDEX: 41.66 KG/M2 | SYSTOLIC BLOOD PRESSURE: 145 MMHG

## 2019-04-01 DIAGNOSIS — R52 PAIN: ICD-10-CM

## 2019-04-01 DIAGNOSIS — R07.89 ATYPICAL CHEST PAIN: Primary | ICD-10-CM

## 2019-04-01 DIAGNOSIS — K21.9 GASTROESOPHAGEAL REFLUX DISEASE, ESOPHAGITIS PRESENCE NOT SPECIFIED: ICD-10-CM

## 2019-04-01 DIAGNOSIS — K44.9 HIATAL HERNIA: ICD-10-CM

## 2019-04-01 LAB
ALBUMIN SERPL-MCNC: 3.6 G/DL
ALP SERPL-CCNC: 74 U/L
B-HCG UR QL: NEGATIVE
BILIRUB SERPL-MCNC: 0.7 MG/DL
BUN SERPL-MCNC: 8 MG/DL
CALCIUM SERPL-MCNC: 9.3 MG/DL
CHLORIDE SERPL-SCNC: 102 MMOL/L
CREAT SERPL-MCNC: 0.8 MG/DL
CTP QC/QA: YES
GLUCOSE SERPL-MCNC: 92 MG/DL (ref 70–110)
POC ALT (SGPT): 17 U/L
POC AST (SGOT): 20 U/L
POC CARDIAC TROPONIN I: 0 NG/ML
POC PTINR: 0.9 (ref 0.9–1.2)
POC PTWBT: 11.1 SEC (ref 9.7–14.3)
POC TCO2: 26 MMOL/L
POTASSIUM BLD-SCNC: 3.3 MMOL/L
PROTEIN, POC: 7.4 G/DL
SAMPLE: NORMAL
SAMPLE: NORMAL
SODIUM BLD-SCNC: 141 MMOL/L

## 2019-04-01 PROCEDURE — 25000003 PHARM REV CODE 250: Mod: ER | Performed by: EMERGENCY MEDICINE

## 2019-04-01 PROCEDURE — 85025 COMPLETE CBC W/AUTO DIFF WBC: CPT | Mod: ER

## 2019-04-01 PROCEDURE — 96374 THER/PROPH/DIAG INJ IV PUSH: CPT | Mod: ER

## 2019-04-01 PROCEDURE — 80053 COMPREHEN METABOLIC PANEL: CPT | Mod: ER

## 2019-04-01 PROCEDURE — 93005 ELECTROCARDIOGRAM TRACING: CPT | Mod: ER

## 2019-04-01 PROCEDURE — 81025 URINE PREGNANCY TEST: CPT | Mod: ER | Performed by: EMERGENCY MEDICINE

## 2019-04-01 PROCEDURE — 84484 ASSAY OF TROPONIN QUANT: CPT | Mod: ER

## 2019-04-01 PROCEDURE — 93010 EKG 12-LEAD: ICD-10-PCS | Mod: ,,, | Performed by: INTERNAL MEDICINE

## 2019-04-01 PROCEDURE — 99285 EMERGENCY DEPT VISIT HI MDM: CPT | Mod: 25,ER

## 2019-04-01 PROCEDURE — 93010 ELECTROCARDIOGRAM REPORT: CPT | Mod: ,,, | Performed by: INTERNAL MEDICINE

## 2019-04-01 PROCEDURE — 63600175 PHARM REV CODE 636 W HCPCS: Mod: ER | Performed by: EMERGENCY MEDICINE

## 2019-04-01 PROCEDURE — 85610 PROTHROMBIN TIME: CPT | Mod: ER

## 2019-04-01 PROCEDURE — 25500020 PHARM REV CODE 255: Mod: ER | Performed by: EMERGENCY MEDICINE

## 2019-04-01 PROCEDURE — 96361 HYDRATE IV INFUSION ADD-ON: CPT | Mod: ER

## 2019-04-01 RX ORDER — ASPIRIN 325 MG
325 TABLET ORAL
Status: COMPLETED | OUTPATIENT
Start: 2019-04-01 | End: 2019-04-01

## 2019-04-01 RX ORDER — SUCRALFATE 1 G/10ML
1 SUSPENSION ORAL
Qty: 414 ML | Refills: 0 | Status: SHIPPED | OUTPATIENT
Start: 2019-04-01

## 2019-04-01 RX ORDER — ESOMEPRAZOLE MAGNESIUM 40 MG/1
40 CAPSULE, DELAYED RELEASE ORAL 2 TIMES DAILY
Qty: 180 CAPSULE | Refills: 0 | Status: SHIPPED | OUTPATIENT
Start: 2019-04-01

## 2019-04-01 RX ORDER — ONDANSETRON 2 MG/ML
4 INJECTION INTRAMUSCULAR; INTRAVENOUS
Status: COMPLETED | OUTPATIENT
Start: 2019-04-01 | End: 2019-04-01

## 2019-04-01 RX ADMIN — ASPIRIN 325 MG ORAL TABLET 325 MG: 325 PILL ORAL at 03:04

## 2019-04-01 RX ADMIN — LIDOCAINE HYDROCHLORIDE: 20 SOLUTION ORAL; TOPICAL at 05:04

## 2019-04-01 RX ADMIN — IOHEXOL 100 ML: 350 INJECTION, SOLUTION INTRAVENOUS at 03:04

## 2019-04-01 RX ADMIN — SODIUM CHLORIDE, SODIUM LACTATE, POTASSIUM CHLORIDE, AND CALCIUM CHLORIDE 1000 ML: .6; .31; .03; .02 INJECTION, SOLUTION INTRAVENOUS at 03:04

## 2019-04-01 RX ADMIN — ONDANSETRON 4 MG: 2 INJECTION INTRAMUSCULAR; INTRAVENOUS at 03:04

## 2019-04-01 NOTE — ED PROVIDER NOTES
"Encounter Date: 4/1/2019    SCRIBE #1 NOTE: I, Mary Lewis, am scribing for, and in the presence of,  Dr. House . I have scribed the following portions of the note - Other sections scribed: HPI,ROS,PE .       History     Chief Complaint   Patient presents with    Abdominal Pain     Pt states, " My chest and stomach have been hurting for three days."    Chest Pain     42 y/o/f with a hx of PE and hiatal hernia presents with abdominal burning after stopping xarelto in January for a PE. At that time she was travelling internationally. She was on xarelto for 6 months. The left sided chest pain and right shoulder pain have been going on for 3 days. Has a recent upcoming appointment with hematology. She is also experiencing chills and vomiting. Denies blood/dark color stool, problems urinating or fever. LNMP was 3/25/19.     The history is provided by the patient. No  was used.     Review of patient's allergies indicates:  No Known Allergies  Past Medical History:   Diagnosis Date    Hypertension     IBS (irritable bowel syndrome)     Migraines     Pulmonary embolism     Sleep apnea      History reviewed. No pertinent surgical history.  Family History   Problem Relation Age of Onset    Hypertension Mother     Graves' disease Mother     Deep vein thrombosis Mother     Heart disease Maternal Grandmother     Hypertension Maternal Grandmother     Deep vein thrombosis Maternal Grandmother      Social History     Tobacco Use    Smoking status: Never Smoker    Smokeless tobacco: Never Used   Substance Use Topics    Alcohol use: Yes     Comment: social    Drug use: No     Review of Systems   Constitutional: Positive for chills. Negative for fever.   HENT: Negative for sore throat.    Respiratory: Negative for shortness of breath.    Cardiovascular: Positive for chest pain.   Gastrointestinal: Positive for abdominal pain, nausea and vomiting. Negative for diarrhea.   Genitourinary: " Negative for difficulty urinating and dysuria.   Musculoskeletal: Positive for arthralgias (right shoulder pain). Negative for back pain.   Skin: Negative for rash.   Neurological: Negative for weakness.   Hematological: Does not bruise/bleed easily.   All other systems reviewed and are negative.      Physical Exam     Initial Vitals [04/01/19 1422]   BP Pulse Resp Temp SpO2   (!) 147/87 76 16 98 °F (36.7 °C) 100 %      MAP       --         Physical Exam    Nursing note and vitals reviewed.  Constitutional: She appears well-developed and well-nourished. She appears distressed (mild).   HENT:   Head: Normocephalic and atraumatic.   Eyes: Conjunctivae are normal.   Neck: Normal range of motion. Neck supple.   Cardiovascular: Normal rate, regular rhythm, normal heart sounds and intact distal pulses. Exam reveals no gallop and no friction rub.    No murmur heard.  Pulmonary/Chest: Effort normal and breath sounds normal. No respiratory distress. She has no wheezes. She has no rhonchi. She has no rales.   Abdominal: Soft. There is tenderness (mild epigastric) in the epigastric area. There is no rebound and no guarding.   Musculoskeletal: Normal range of motion.   Neurological: She is alert and oriented to person, place, and time.   Skin: Skin is warm and dry.   Psychiatric: She has a normal mood and affect. Her behavior is normal.         ED Course   Procedures  Labs Reviewed   TROPONIN ISTAT   POCT URINE PREGNANCY   POCT CBC   POCT CMP   POCT PROTIME-INR   POCT TROPONIN   POCT CMP   ISTAT PROCEDURE        ECG Results          EKG 12-lead (In process)  Result time 04/01/19 14:49:43    In process by Interface, Lab In [facility] (04/01/19 14:49:43)                 Narrative:    Test Reason : R52,    Vent. Rate : 072 BPM     Atrial Rate : 072 BPM     P-R Int : 152 ms          QRS Dur : 070 ms      QT Int : 386 ms       P-R-T Axes : 060 057 054 degrees     QTc Int : 422 ms    Normal sinus rhythm  Normal ECG  When compared with  ECG of 17-SEP-2018 14:01,  No significant change was found    Referred By: [email or web address]   SELF           Confirmed By:                             Imaging Results          CTA Chest Non-Coronary - PE Study (Final result)  Result time 04/01/19 16:05:49    Final result by Dennis Willingham MD (04/01/19 16:05:49)                 Impression:      1. No acute cardiopulmonary process seen.  Specifically, no evidence of pulmonary thromboembolism or pulmonary infarction.  2. Hiatal hernia.      Electronically signed by: Dennis Willingham MD  Date:    04/01/2019  Time:    16:05             Narrative:    EXAMINATION:  CTA CHEST NON CORONARY    CLINICAL HISTORY:  Chest pain, acute, PE suspected, high pretest prob;Patient with history of PE, off Xarelto, now with chest pain;    TECHNIQUE:  Low dose axial images, sagittal and coronal reformations were obtained from the thoracic inlet to the lung bases following the IV administration of 100 mL of Omnipaque 350.  Contrast timing was optimized to evaluate the pulmonary arteries.  MIP images were performed.    COMPARISON:  Chest radiograph 10/01/2018 and CT thorax 09/17/2018    FINDINGS:  Timing of contrast bolus is adequate.  No convincing evidence of pulmonary thromboembolism or pulmonary infarction.  Pulmonary trunk is within normal limits.  Pulmonary arteries distribute normally.  There are 4 pulmonary veins.    Heart is normal in size the out significant pericardial fluid.  No cardiac thrombus seen.    There is a left-sided arch with bovine configuration and the left vertebral artery arises directly from the arch.  Left vertebral artery is hypoplastic.    Small to moderate-sized hiatal hernia.  Esophagus is within normal limits.  No mediastinal, hilar or axillary lymphadenopathy.    Trachea is midline and patent.  Proximal airways are patent.  The lungs are symmetrically well expanded without focal consolidation, pleural effusion or pneumothorax.  2 mm calcified granuloma within the  anterior right upper lobe.  No suspicious pulmonary nodules.    Structures at the base of the neck are within normal limits.  Imaged extrathoracic soft tissues are within normal limits.  Included upper abdomen is within normal limits.                                 Medical Decision Making:   Independently Interpreted Test(s):   I have ordered and independently interpreted EKG Reading(s) - see prior notes  Clinical Tests:   Lab Tests: Reviewed and Ordered  Medical Tests: Ordered and Reviewed            Scribe Attestation:   Scribe #1: I performed the above scribed service and the documentation accurately describes the services I performed. I attest to the accuracy of the note.    I attest that I personally performed the services documented by the scribe and acknowledged and confirm the content of the note. Jose Miguel House      The following clinical decision rules were used in the management of this patient:  PERC  Prior DVT or PE and therefore CTA performed          ED Course as of Apr 01 1830   Mon Apr 01, 2019   1654 CMP within normal limits   POC Glucose: 92 [MH]   1655 Troponin is normal   POC Cardiac Troponin I: 0.00 [MH]   1655 INR is normal   POC PTWBT: 11.1 [MH]   1655 UPT is negative   Preg Test, Ur: Negative [MH]   1655 No PE but there is a hiatal hernia    [MH]   1655 CBC reviewed and there is mild anemia   POCT CBC [MH]   1727 My independent interpretation of the EKG is normal sinus rhythm with no ST segment elevation.   EKG 12-lead [MH]   1829 Patient has improved.  Imaging and labs are nondiagnostic.  I will refer back to her primary care.    [MH]      ED Course User Index  [MH] Jose Miguel House MD     Clinical Impression:     1. Atypical chest pain    2. Pain    3. Hiatal hernia                                   Jose Miguel House MD  04/01/19 1725       Jose Miguel House MD  04/01/19 1830

## 2019-04-01 NOTE — ED NOTES
C/O burning abdominal pain epigastric area has been going on for over a month - left sided chest pain and right shoulder pain since Friday - states she was on xarelto for 6 months stopped in January  For PE in left lung . States pain started after she stopped - ekg completed

## 2019-05-01 DIAGNOSIS — F41.8 ANXIETY ASSOCIATED WITH DEPRESSION: ICD-10-CM

## 2019-05-02 RX ORDER — DIAZEPAM 10 MG/1
10 TABLET ORAL 2 TIMES DAILY PRN
Qty: 45 TABLET | Refills: 2 | OUTPATIENT
Start: 2019-05-02 | End: 2019-06-16

## 2019-06-28 ENCOUNTER — TELEPHONE (OUTPATIENT)
Dept: FAMILY MEDICINE | Facility: CLINIC | Age: 44
End: 2019-06-28

## 2020-08-14 DIAGNOSIS — Z11.59 NEED FOR HEPATITIS C SCREENING TEST: ICD-10-CM

## 2020-10-05 ENCOUNTER — PATIENT MESSAGE (OUTPATIENT)
Dept: ADMINISTRATIVE | Facility: HOSPITAL | Age: 45
End: 2020-10-05

## 2021-01-05 ENCOUNTER — PATIENT MESSAGE (OUTPATIENT)
Dept: ADMINISTRATIVE | Facility: HOSPITAL | Age: 46
End: 2021-01-05

## 2021-04-06 ENCOUNTER — PATIENT MESSAGE (OUTPATIENT)
Dept: ADMINISTRATIVE | Facility: HOSPITAL | Age: 46
End: 2021-04-06

## 2021-04-26 ENCOUNTER — PATIENT MESSAGE (OUTPATIENT)
Dept: RESEARCH | Facility: HOSPITAL | Age: 46
End: 2021-04-26

## 2021-07-07 ENCOUNTER — PATIENT MESSAGE (OUTPATIENT)
Dept: ADMINISTRATIVE | Facility: HOSPITAL | Age: 46
End: 2021-07-07

## 2024-05-06 ENCOUNTER — NURSE TRIAGE (OUTPATIENT)
Dept: ADMINISTRATIVE | Facility: CLINIC | Age: 49
End: 2024-05-06
Payer: COMMERCIAL

## 2024-05-06 NOTE — TELEPHONE ENCOUNTER
Pt was seen by Md in [place] and she had an abnormal mammogram. She now live in [place] and she is trying to get an Ultrasound of her breast. A targeted US of left breast and another kind of test of right breast. She is now starting to have fluid coming from her right nipple that is clear yellow color. Low grade temp this weekend 99.6-100.2 this past weekend. Breast pain in both breast more in right 7.5-8/10. When she removes her bra it hurts worse. Breast feel hot. Care advice recommend pt is seen today. Pt does not have a pcp with [facility] and stated [facility] Pt will [facility] to ER.   Reason for Disposition   Breast looks infected (spreading redness, feels hot or painful to touch) and no fever    Additional Information   Negative: SEVERE breast pain and fever > 103 F  (39.4 C)   Negative: Patient sounds very sick or weak to the triager   Negative: Breast looks infected (spreading redness, feels hot or painful to touch) and fever    Protocols used: Breast Symptoms-A-[place]